# Patient Record
Sex: MALE | Race: WHITE | NOT HISPANIC OR LATINO | Employment: FULL TIME | URBAN - METROPOLITAN AREA
[De-identification: names, ages, dates, MRNs, and addresses within clinical notes are randomized per-mention and may not be internally consistent; named-entity substitution may affect disease eponyms.]

---

## 2017-03-21 ENCOUNTER — CONSULT (OUTPATIENT)
Dept: CARDIOLOGY | Facility: CLINIC | Age: 37
End: 2017-03-21

## 2017-03-21 VITALS
HEIGHT: 71 IN | WEIGHT: 183.8 LBS | BODY MASS INDEX: 25.73 KG/M2 | HEART RATE: 65 BPM | DIASTOLIC BLOOD PRESSURE: 78 MMHG | SYSTOLIC BLOOD PRESSURE: 128 MMHG

## 2017-03-21 DIAGNOSIS — I45.6 WOLFF-PARKINSON-WHITE SYNDROME: Primary | ICD-10-CM

## 2017-03-21 DIAGNOSIS — R07.89 OTHER CHEST PAIN: ICD-10-CM

## 2017-03-21 PROCEDURE — 93000 ELECTROCARDIOGRAM COMPLETE: CPT | Performed by: INTERNAL MEDICINE

## 2017-03-21 PROCEDURE — 99243 OFF/OP CNSLTJ NEW/EST LOW 30: CPT | Performed by: INTERNAL MEDICINE

## 2017-03-21 RX ORDER — FLUTICASONE PROPIONATE 50 MCG
2 SPRAY, SUSPENSION (ML) NASAL DAILY
COMMUNITY
End: 2023-02-01

## 2017-03-21 RX ORDER — METOPROLOL TARTRATE 50 MG/1
50 TABLET, FILM COATED ORAL 2 TIMES DAILY
COMMUNITY
End: 2017-03-30

## 2017-03-21 RX ORDER — FEXOFENADINE HCL 180 MG/1
180 TABLET ORAL DAILY
COMMUNITY

## 2017-03-21 NOTE — PROGRESS NOTES
"PCP: Dr. Ramya Brannon MD  REFERRING MD: Agustin Padgett MD  Prior Cardiologist:      Chief Complaint: WPW  Referred for consult regarding abnormal EKG WPW.  Onset: 3/17  Duration: Hours  Location: Chest  Quality: Intermittent  Frequency: Once  Severity: Moderate  Timing: Random  Symptoms Atypical chest pain with sob,vasovagal symptoms  Associated Factors/Procedures to treat: [facility] 3/13/17  Medication(s) used: Beta blocker started by Dr. Agustin Padgett     No problems updated.    Past Medical History   Diagnosis Date   • Arrhythmia    • Atrial fibrillation    • Chest pain      left sided - radiating down left arm    • Migraine headache    • Seasonal allergies      with allergy shots    • Priscilla-Parkinson-White syndrome         Past Surgical History   Procedure Laterality Date   • Tonsillectomy  1980          Family History: no early family history of CAD    Social History     Social History   • Marital status:      Spouse name: N/A   • Number of children: N/A   • Years of education: N/A     Social History Main Topics   • Smoking status: Never Smoker   • Smokeless tobacco: Never Used   • Alcohol use No   • Drug use: No   • Sexual activity: Defer     Other Topics Concern   • None     Social History Narrative          Current Outpatient Prescriptions:   •  fexofenadine (ALLEGRA) 180 MG tablet, Take 180 mg by mouth Daily., Disp: , Rfl:   •  fluticasone (FLONASE) 50 MCG/ACT nasal spray, 2 sprays into each nostril Daily., Disp: , Rfl:   •  metoprolol tartrate (LOPRESSOR) 50 MG tablet, Take 50 mg by mouth 2 (Two) Times a Day., Disp: , Rfl:    No Known Allergies     Review of Symptoms: Completely negative save the above pertinent findings    Physical Exam:    Visit Vitals   • /78 (BP Location: Left arm, Patient Position: Sitting)   • Pulse 65   • Ht 71\" (180.3 cm)   • Wt 183 lb 12.8 oz (83.4 kg)   • BMI 25.63 kg/m2        General: Appears well groomed, well developed, appearing stated age, in " no acute distress or discomfort  HEENT: Normocephalic, atraumatic, PERRLA, EOMI, sclera anicteric, oral mucosa moist, no erythema or exudates  Neck: Supple, non-tender, no JVD, no thyroid nodularity or thyromegaly, no bruits, pules normal  Lungs; CTA, no wheezing, equal air entry bilaterally, resonant to percussion  Cor: RRR, physiologic S1/S2, no rubs, gallops, murmurs thrills, rubs or clicks  Abd: Soft, non-tender, no organomegaly or bruits  Ext: Non-tender, no edema ,no cycanosis,  femoral/pedal pulses normal  Neuro: AAO X 3 CN II-XII grossly intact and equal bilaterally  Skin: pink, warm, dry  Musculoskeletal: Normal range of motion and no liborio abnormalities        ECG 12 Lead  Date/Time: 3/21/2017 2:40 PM  Performed by: ANTWON GUTIERREZ  Authorized by: ANTWON GUTIERREZ   Comparison: not compared with previous ECG   Rhythm: sinus rhythm  Other findings: delta wave             Diagnosis Plan   1. Priscilla-Parkinson-White syndrome  EPS +/- ablation   2. Other chest pain        Will Panchtio ROCA as a Scribe for Dr. Gutierrez    The patient's old records including ambulatory rhythm recordings (ECGs, Holter/event monitor) and ultrasound (ECHO) were reviewed and discussed.          Antwon HARRINGTON MD personally performed the services described as documented by the above named individual. I have made any necessary edits, and it is both accurate and complete.

## 2017-03-24 ENCOUNTER — PREP FOR SURGERY (OUTPATIENT)
Dept: CARDIOLOGY | Facility: CLINIC | Age: 37
End: 2017-03-24

## 2017-03-24 DIAGNOSIS — I45.6 WPW (WOLFF-PARKINSON-WHITE SYNDROME): Primary | ICD-10-CM

## 2017-03-24 RX ORDER — ACETAMINOPHEN 325 MG/1
650 TABLET ORAL EVERY 4 HOURS PRN
Status: CANCELLED | OUTPATIENT
Start: 2017-03-24

## 2017-03-24 RX ORDER — SODIUM CHLORIDE 0.9 % (FLUSH) 0.9 %
1-10 SYRINGE (ML) INJECTION AS NEEDED
Status: CANCELLED | OUTPATIENT
Start: 2017-03-24

## 2017-03-30 ENCOUNTER — APPOINTMENT (OUTPATIENT)
Dept: PREADMISSION TESTING | Facility: HOSPITAL | Age: 37
End: 2017-03-30

## 2017-03-30 DIAGNOSIS — I45.6 WPW (WOLFF-PARKINSON-WHITE SYNDROME): ICD-10-CM

## 2017-03-30 LAB
ANION GAP SERPL CALCULATED.3IONS-SCNC: 10 MMOL/L (ref 3–11)
BASOPHILS # BLD AUTO: 0.04 10*3/MM3 (ref 0–0.2)
BASOPHILS NFR BLD AUTO: 0.6 % (ref 0–1)
BUN BLD-MCNC: 13 MG/DL (ref 9–23)
BUN/CREAT SERPL: 10.8 (ref 7–25)
CALCIUM SPEC-SCNC: 9.7 MG/DL (ref 8.7–10.4)
CHLORIDE SERPL-SCNC: 102 MMOL/L (ref 99–109)
CO2 SERPL-SCNC: 29 MMOL/L (ref 20–31)
CREAT BLD-MCNC: 1.2 MG/DL (ref 0.6–1.3)
DEPRECATED RDW RBC AUTO: 38.7 FL (ref 37–54)
EOSINOPHIL # BLD AUTO: 0.22 10*3/MM3 (ref 0.1–0.3)
EOSINOPHIL NFR BLD AUTO: 3.3 % (ref 0–3)
ERYTHROCYTE [DISTWIDTH] IN BLOOD BY AUTOMATED COUNT: 12.7 % (ref 11.3–14.5)
GFR SERPL CREATININE-BSD FRML MDRD: 67 ML/MIN/1.73
GLUCOSE BLD-MCNC: 98 MG/DL (ref 70–100)
HCT VFR BLD AUTO: 42.8 % (ref 38.9–50.9)
HGB BLD-MCNC: 14.7 G/DL (ref 13.1–17.5)
IMM GRANULOCYTES # BLD: 0 10*3/MM3 (ref 0–0.03)
IMM GRANULOCYTES NFR BLD: 0 % (ref 0–0.6)
INR PPP: 0.99
LYMPHOCYTES # BLD AUTO: 2.96 10*3/MM3 (ref 0.6–4.8)
LYMPHOCYTES NFR BLD AUTO: 44.4 % (ref 24–44)
MCH RBC QN AUTO: 28.9 PG (ref 27–31)
MCHC RBC AUTO-ENTMCNC: 34.3 G/DL (ref 32–36)
MCV RBC AUTO: 84.1 FL (ref 80–99)
MONOCYTES # BLD AUTO: 0.51 10*3/MM3 (ref 0–1)
MONOCYTES NFR BLD AUTO: 7.7 % (ref 0–12)
NEUTROPHILS # BLD AUTO: 2.93 10*3/MM3 (ref 1.5–8.3)
NEUTROPHILS NFR BLD AUTO: 44 % (ref 41–71)
PLATELET # BLD AUTO: 193 10*3/MM3 (ref 150–450)
PMV BLD AUTO: 10.2 FL (ref 6–12)
POTASSIUM BLD-SCNC: 4 MMOL/L (ref 3.5–5.5)
PROTHROMBIN TIME: 10.8 SECONDS (ref 9.6–11.5)
RBC # BLD AUTO: 5.09 10*6/MM3 (ref 4.2–5.76)
SODIUM BLD-SCNC: 141 MMOL/L (ref 132–146)
WBC NRBC COR # BLD: 6.66 10*3/MM3 (ref 3.5–10.8)

## 2017-03-30 PROCEDURE — 85610 PROTHROMBIN TIME: CPT | Performed by: PHYSICIAN ASSISTANT

## 2017-03-30 PROCEDURE — 80048 BASIC METABOLIC PNL TOTAL CA: CPT | Performed by: PHYSICIAN ASSISTANT

## 2017-03-30 PROCEDURE — 85025 COMPLETE CBC W/AUTO DIFF WBC: CPT | Performed by: PHYSICIAN ASSISTANT

## 2017-03-30 PROCEDURE — 36415 COLL VENOUS BLD VENIPUNCTURE: CPT

## 2017-03-30 RX ORDER — METOPROLOL TARTRATE 50 MG/1
50 TABLET, FILM COATED ORAL 2 TIMES DAILY
COMMUNITY
Start: 2017-03-13 | End: 2017-03-30

## 2017-03-31 ENCOUNTER — HOSPITAL ENCOUNTER (OUTPATIENT)
Facility: HOSPITAL | Age: 37
Discharge: HOME | End: 2017-03-31
Attending: INTERNAL MEDICINE | Admitting: INTERNAL MEDICINE

## 2017-03-31 VITALS
SYSTOLIC BLOOD PRESSURE: 125 MMHG | HEIGHT: 72 IN | HEART RATE: 98 BPM | RESPIRATION RATE: 14 BRPM | DIASTOLIC BLOOD PRESSURE: 85 MMHG | OXYGEN SATURATION: 92 % | WEIGHT: 179.9 LBS | BODY MASS INDEX: 24.37 KG/M2 | TEMPERATURE: 99.2 F

## 2017-03-31 DIAGNOSIS — I45.6 WOLFF-PARKINSON-WHITE SYNDROME: ICD-10-CM

## 2017-03-31 PROCEDURE — 93623 PRGRMD STIMJ&PACG IV RX NFS: CPT | Performed by: INTERNAL MEDICINE

## 2017-03-31 PROCEDURE — C1732 CATH, EP, DIAG/ABL, 3D/VECT: HCPCS | Performed by: INTERNAL MEDICINE

## 2017-03-31 PROCEDURE — C1730 CATH, EP, 19 OR FEW ELECT: HCPCS | Performed by: INTERNAL MEDICINE

## 2017-03-31 PROCEDURE — 25010000002 FENTANYL CITRATE (PF) 100 MCG/2ML SOLUTION: Performed by: INTERNAL MEDICINE

## 2017-03-31 PROCEDURE — 93621 COMP EP EVL L PAC&REC C SINS: CPT | Performed by: INTERNAL MEDICINE

## 2017-03-31 PROCEDURE — C1894 INTRO/SHEATH, NON-LASER: HCPCS | Performed by: INTERNAL MEDICINE

## 2017-03-31 PROCEDURE — 25010000002 ONDANSETRON PER 1 MG: Performed by: INTERNAL MEDICINE

## 2017-03-31 PROCEDURE — 93620 COMP EP EVL R AT VEN PAC&REC: CPT | Performed by: INTERNAL MEDICINE

## 2017-03-31 PROCEDURE — 25010000002 HEPARIN (PORCINE) PER 1000 UNITS: Performed by: INTERNAL MEDICINE

## 2017-03-31 PROCEDURE — 25010000002 MIDAZOLAM PER 1 MG: Performed by: INTERNAL MEDICINE

## 2017-03-31 PROCEDURE — 93613 INTRACARDIAC EPHYS 3D MAPG: CPT | Performed by: INTERNAL MEDICINE

## 2017-03-31 RX ORDER — LIDOCAINE HYDROCHLORIDE 10 MG/ML
INJECTION, SOLUTION INFILTRATION; PERINEURAL AS NEEDED
Status: DISCONTINUED | OUTPATIENT
Start: 2017-03-31 | End: 2017-03-31 | Stop reason: HOSPADM

## 2017-03-31 RX ORDER — ONDANSETRON 2 MG/ML
INJECTION INTRAMUSCULAR; INTRAVENOUS AS NEEDED
Status: DISCONTINUED | OUTPATIENT
Start: 2017-03-31 | End: 2017-03-31 | Stop reason: HOSPADM

## 2017-03-31 RX ORDER — FENTANYL CITRATE 50 UG/ML
INJECTION, SOLUTION INTRAMUSCULAR; INTRAVENOUS AS NEEDED
Status: DISCONTINUED | OUTPATIENT
Start: 2017-03-31 | End: 2017-03-31 | Stop reason: HOSPADM

## 2017-03-31 RX ORDER — MIDAZOLAM HYDROCHLORIDE 1 MG/ML
INJECTION INTRAMUSCULAR; INTRAVENOUS AS NEEDED
Status: DISCONTINUED | OUTPATIENT
Start: 2017-03-31 | End: 2017-03-31 | Stop reason: HOSPADM

## 2017-03-31 RX ORDER — FLECAINIDE ACETATE 50 MG/1
50 TABLET ORAL 2 TIMES DAILY
Qty: 60 TABLET | Refills: 11 | Status: SHIPPED | OUTPATIENT
Start: 2017-03-31 | End: 2017-05-23 | Stop reason: SDUPTHER

## 2017-03-31 RX ORDER — ONDANSETRON 2 MG/ML
4 INJECTION INTRAMUSCULAR; INTRAVENOUS EVERY 6 HOURS PRN
Status: DISCONTINUED | OUTPATIENT
Start: 2017-03-31 | End: 2017-03-31 | Stop reason: HOSPADM

## 2017-03-31 RX ORDER — SODIUM CHLORIDE 9 MG/ML
INJECTION, SOLUTION INTRAVENOUS CONTINUOUS PRN
Status: DISCONTINUED | OUTPATIENT
Start: 2017-03-31 | End: 2017-03-31 | Stop reason: HOSPADM

## 2017-03-31 RX ORDER — SODIUM CHLORIDE 0.9 % (FLUSH) 0.9 %
1-10 SYRINGE (ML) INJECTION AS NEEDED
Status: DISCONTINUED | OUTPATIENT
Start: 2017-03-31 | End: 2017-03-31 | Stop reason: HOSPADM

## 2017-03-31 RX ORDER — ACETAMINOPHEN 325 MG/1
650 TABLET ORAL EVERY 4 HOURS PRN
Status: DISCONTINUED | OUTPATIENT
Start: 2017-03-31 | End: 2017-03-31 | Stop reason: HOSPADM

## 2017-03-31 RX ORDER — HYDROCODONE BITARTRATE AND ACETAMINOPHEN 10; 325 MG/1; MG/1
1 TABLET ORAL EVERY 4 HOURS PRN
Status: DISCONTINUED | OUTPATIENT
Start: 2017-03-31 | End: 2017-03-31 | Stop reason: HOSPADM

## 2017-04-03 ENCOUNTER — HOSPITAL ENCOUNTER (OUTPATIENT)
Dept: OTHER | Age: 37
Discharge: HOME | End: 2017-04-03
Attending: INTERNAL MEDICINE | Admitting: INTERNAL MEDICINE

## 2017-05-23 ENCOUNTER — OFFICE VISIT (OUTPATIENT)
Dept: CARDIOLOGY | Facility: CLINIC | Age: 37
End: 2017-05-23

## 2017-05-23 VITALS
BODY MASS INDEX: 25.63 KG/M2 | WEIGHT: 189.2 LBS | HEART RATE: 91 BPM | HEIGHT: 72 IN | SYSTOLIC BLOOD PRESSURE: 110 MMHG | DIASTOLIC BLOOD PRESSURE: 78 MMHG

## 2017-05-23 DIAGNOSIS — I45.6 WOLFF-PARKINSON-WHITE SYNDROME: Primary | ICD-10-CM

## 2017-05-23 PROCEDURE — 99212 OFFICE O/P EST SF 10 MIN: CPT | Performed by: PHYSICIAN ASSISTANT

## 2017-05-23 PROCEDURE — 93000 ELECTROCARDIOGRAM COMPLETE: CPT | Performed by: PHYSICIAN ASSISTANT

## 2017-05-23 RX ORDER — FLECAINIDE ACETATE 50 MG/1
50 TABLET ORAL 2 TIMES DAILY
Qty: 60 TABLET | Refills: 11 | Status: SHIPPED | OUTPATIENT
Start: 2017-05-23 | End: 2018-01-10 | Stop reason: SDUPTHER

## 2017-05-30 ENCOUNTER — OFFICE VISIT (OUTPATIENT)
Dept: INTERNAL MEDICINE | Facility: CLINIC | Age: 37
End: 2017-05-30

## 2017-05-30 VITALS
HEIGHT: 72 IN | HEART RATE: 78 BPM | TEMPERATURE: 98.2 F | SYSTOLIC BLOOD PRESSURE: 102 MMHG | WEIGHT: 189 LBS | DIASTOLIC BLOOD PRESSURE: 60 MMHG | OXYGEN SATURATION: 99 % | BODY MASS INDEX: 25.6 KG/M2

## 2017-05-30 DIAGNOSIS — H69.81 EUSTACHIAN TUBE DYSFUNCTION, RIGHT: ICD-10-CM

## 2017-05-30 DIAGNOSIS — H65.91 OME (OTITIS MEDIA WITH EFFUSION), RIGHT: Primary | ICD-10-CM

## 2017-05-30 PROCEDURE — 96372 THER/PROPH/DIAG INJ SC/IM: CPT | Performed by: PHYSICIAN ASSISTANT

## 2017-05-30 PROCEDURE — 99213 OFFICE O/P EST LOW 20 MIN: CPT | Performed by: PHYSICIAN ASSISTANT

## 2017-05-30 RX ORDER — METHYLPREDNISOLONE ACETATE 80 MG/ML
80 INJECTION, SUSPENSION INTRA-ARTICULAR; INTRALESIONAL; INTRAMUSCULAR; SOFT TISSUE ONCE
Status: COMPLETED | OUTPATIENT
Start: 2017-05-30 | End: 2017-05-30

## 2017-05-30 RX ORDER — AMOXICILLIN AND CLAVULANATE POTASSIUM 875; 125 MG/1; MG/1
1 TABLET, FILM COATED ORAL 2 TIMES DAILY
Qty: 14 TABLET | Refills: 0 | Status: SHIPPED | OUTPATIENT
Start: 2017-05-30 | End: 2017-06-06

## 2017-05-30 RX ADMIN — METHYLPREDNISOLONE ACETATE 80 MG: 80 INJECTION, SUSPENSION INTRA-ARTICULAR; INTRALESIONAL; INTRAMUSCULAR; SOFT TISSUE at 11:54

## 2018-01-10 ENCOUNTER — OFFICE VISIT (OUTPATIENT)
Dept: CARDIOLOGY | Facility: CLINIC | Age: 38
End: 2018-01-10

## 2018-01-10 VITALS
WEIGHT: 196 LBS | BODY MASS INDEX: 27.44 KG/M2 | SYSTOLIC BLOOD PRESSURE: 126 MMHG | DIASTOLIC BLOOD PRESSURE: 78 MMHG | HEART RATE: 80 BPM | HEIGHT: 71 IN

## 2018-01-10 DIAGNOSIS — I45.6 WOLFF-PARKINSON-WHITE SYNDROME: Primary | ICD-10-CM

## 2018-01-10 DIAGNOSIS — E78.2 MIXED HYPERLIPIDEMIA: ICD-10-CM

## 2018-01-10 PROCEDURE — 93000 ELECTROCARDIOGRAM COMPLETE: CPT | Performed by: INTERNAL MEDICINE

## 2018-01-10 PROCEDURE — 99213 OFFICE O/P EST LOW 20 MIN: CPT | Performed by: INTERNAL MEDICINE

## 2018-01-10 RX ORDER — FLECAINIDE ACETATE 50 MG/1
50 TABLET ORAL EVERY 12 HOURS SCHEDULED
Qty: 180 TABLET | Refills: 1 | Status: SHIPPED | OUTPATIENT
Start: 2018-01-10 | End: 2018-06-19 | Stop reason: SDUPTHER

## 2018-01-10 RX ORDER — ACETAMINOPHEN 325 MG/1
650 TABLET ORAL AS NEEDED
COMMUNITY

## 2018-01-10 NOTE — PROGRESS NOTES
"[facility] at [facility]  Office visit  Rj Rincon  1980  [phone]  [phone]  VISIT DATE:  01/10/2018    PCP: Ramya Brannon MD  [address] [place] [id number]    CC:  No chief complaint on file.      PROBLEM LIST:  A. 5/9/2012 12 lead EKG revealing Priscilla Parkinson-White Pattern at 78 beats per minute  B. No previous cardiac evaluation   C. EPS 3/31/17 with mid-septal pathway antegrade block point 240 ms with no inducible arrhythmias, and sensitive to catheter pressure, no ablation attempted  D. Flecainide initiated 3/31/17-    ASSESSMENT:   Diagnosis Plan   1. Priscilla-Parkinson-White syndrome         PLAN:  Continue flecainide 50 mg by mouth twice a day  repeat fasting lipid panel prior follow-up.    Subjective  Stable functional capacity.  Blood pressures running less than 130/80 mmHg.  Denies sustained palpitations, presyncope, syncope or chest pain.  Tolerating medications without side effects.  Reviewed 12-lead EKG which reveals stable preexcitation.  Also reviewed previous lipid panel.  He does have an extensive family history of coronary artery disease to include his mother.  Surgical revascularization in her 60s who is a nonsmoker.    PHYSICAL EXAMINATION:  Vitals:    01/10/18 1510   BP: 126/78   BP Location: Right arm   Patient Position: Sitting   Pulse: 80   Weight: 88.9 kg (196 lb)   Height: 180.3 cm (71\")     General Appearance:    Alert, cooperative, no distress, appears stated age   Head:    Normocephalic, without obvious abnormality, atraumatic   Eyes:    conjunctiva/corneas clear   Nose:   Nares normal, septum midline, mucosa normal, no drainage   Throat:   Lips, teeth and gums normal   Neck:   Supple, symmetrical, trachea midline, no carotid    bruit or JVD   Lungs:     Clear to auscultation bilaterally, respirations unlabored   Chest Wall:    No tenderness or deformity    Heart:    Regular rate and rhythm, S1 and S2 normal, no murmur, rub   or gallop, " normal carotid impulse bilaterally without bruit.   Abdomen:     Soft, non-tender   Extremities:   Extremities normal, atraumatic, no cyanosis or edema   Pulses:   2+ and symmetric all extremities   Skin:   Skin color, texture, turgor normal, no rashes or lesions       Diagnostic Data:    ECG 12 Lead  Date/Time: 1/10/2018 3:24 PM  Performed by: BEBETO RIVAS III  Authorized by: BEBETO RIVAS III   Rhythm: sinus rhythm  Other findings comments: Priscilla-Parkinson-White pattern  Clinical impression: abnormal ECG          Lab Results   Component Value Date    CHLPL 189 03/11/2015    TRIG 99 03/11/2015    HDL 45 03/11/2015    LDLDIRECT 140 (H) 03/11/2015     Lab Results   Component Value Date    GLUCOSE 98 03/30/2017    BUN 13 03/30/2017    CREATININE 1.20 03/30/2017     03/30/2017    K 4.0 03/30/2017     03/30/2017    CO2 29.0 03/30/2017     No results found for: HGBA1C  Lab Results   Component Value Date    WBC 6.66 03/30/2017    HGB 14.7 03/30/2017    HCT 42.8 03/30/2017     03/30/2017       Allergies  No Known Allergies    Current Medications    Current Outpatient Prescriptions:   •  acetaminophen (TYLENOL) 325 MG tablet, Take 650 mg by mouth As Needed for Mild Pain ., Disp: , Rfl:   •  Ascorbic Acid (VITAMIN C PO), Take  by mouth Daily., Disp: , Rfl:   •  fexofenadine (ALLEGRA) 180 MG tablet, Take 180 mg by mouth As Needed., Disp: , Rfl:   •  flecainide (TAMBOCOR) 50 MG tablet, Take 1 tablet by mouth 2 (Two) Times a Day., Disp: 60 tablet, Rfl: 11  •  fluticasone (FLONASE) 50 MCG/ACT nasal spray, 2 sprays into each nostril Daily., Disp: , Rfl:   •  ibuprofen (ADVIL,MOTRIN) 100 MG/5ML suspension, Take  by mouth As Needed for Mild Pain ., Disp: , Rfl:   •  Multiple Vitamin (MULTI VITAMIN PO), Take  by mouth Daily., Disp: , Rfl:           ROS  Review of Systems   Cardiovascular: Negative for chest pain, dyspnea on exertion, irregular heartbeat and palpitations.   Respiratory: Negative for shortness of  breath and wheezing.        SOCIAL HX  Social History     Social History   • Marital status:      Spouse name: N/A   • Number of children: N/A   • Years of education: N/A     Occupational History   • Not on file.     Social History Main Topics   • Smoking status: Never Smoker   • Smokeless tobacco: Never Used   • Alcohol use No   • Drug use: No   • Sexual activity: Defer     Other Topics Concern   • Not on file     Social History Narrative       FAMILY HX  Family History   Problem Relation Age of Onset   • Other Mother 69     CABG   • Other Father      CABG in his 60's             Zurdo Allen III, MD, FACC

## 2018-06-19 RX ORDER — FLECAINIDE ACETATE 50 MG/1
50 TABLET ORAL EVERY 12 HOURS SCHEDULED
Qty: 180 TABLET | Refills: 1 | Status: SHIPPED | OUTPATIENT
Start: 2018-06-19 | End: 2019-04-17

## 2018-09-22 ENCOUNTER — HOSPITAL ENCOUNTER (OUTPATIENT)
Facility: HOSPITAL | Age: 38
Discharge: HOME | End: 2018-09-22
Payer: COMMERCIAL

## 2018-09-22 LAB
CHOLESTEROL, TOTAL: 231 MG/DL (ref 0–200)
HDLC SERPL-MCNC: 43 MG/DL (ref 40–60)
LDL CHOLESTEROL CALCULATED: 161 MG/DL
TRIGL SERPL-MCNC: 134 MG/DL (ref 0–249)
VLDLC SERPL CALC-MCNC: 27 MG/DL

## 2018-09-22 PROCEDURE — 36415 COLL VENOUS BLD VENIPUNCTURE: CPT

## 2018-09-22 PROCEDURE — 80061 LIPID PANEL: CPT

## 2018-09-24 DIAGNOSIS — E78.2 MIXED HYPERLIPIDEMIA: ICD-10-CM

## 2018-09-27 ENCOUNTER — OFFICE VISIT (OUTPATIENT)
Dept: CARDIOLOGY | Facility: CLINIC | Age: 38
End: 2018-09-27

## 2018-09-27 ENCOUNTER — HOSPITAL ENCOUNTER (OUTPATIENT)
Facility: HOSPITAL | Age: 38
Discharge: HOME | End: 2018-09-27
Payer: COMMERCIAL

## 2018-09-27 VITALS
DIASTOLIC BLOOD PRESSURE: 82 MMHG | HEIGHT: 71 IN | HEART RATE: 69 BPM | SYSTOLIC BLOOD PRESSURE: 140 MMHG | BODY MASS INDEX: 27.44 KG/M2 | WEIGHT: 196 LBS

## 2018-09-27 DIAGNOSIS — E78.2 MIXED HYPERLIPIDEMIA: ICD-10-CM

## 2018-09-27 DIAGNOSIS — I45.6 WOLFF-PARKINSON-WHITE SYNDROME: Primary | ICD-10-CM

## 2018-09-27 PROCEDURE — 93005 ELECTROCARDIOGRAM TRACING: CPT

## 2018-09-27 PROCEDURE — 99213 OFFICE O/P EST LOW 20 MIN: CPT | Performed by: INTERNAL MEDICINE

## 2018-09-27 NOTE — PROGRESS NOTES
"[facility] at [facility]  Office visit  Rj Rincon  1980  [phone]  [phone]  VISIT DATE:  01/10/2018    PCP: Ramya Brannon MD  [facility] [address] [place] [id number]    CC:  Chief Complaint   Patient presents with   • Priscilla-Parkinson-White Syndrome       PROBLEM LIST:  A. 5/9/2012 12 lead EKG revealing Priscilla Parkinson-White Pattern at 78 beats per minute  B. No previous cardiac evaluation   C. EPS 3/31/17 with mid-septal pathway antegrade block point 240 ms with no inducible arrhythmias, and sensitive to catheter pressure, no ablation attempted  D. Flecainide initiated 3/31/17-    ASSESSMENT:   Diagnosis Plan   1. Priscilla-Parkinson-White syndrome     2. Mixed hyperlipidemia  CT Cardiac Calcium Score Without Dye       PLAN:  Continue flecainide 50 mg by mouth twice a day  Repeat fasting lipid panel prior follow-up. Continue dietary modifications and regular exercise, we'll arrange for coronary artery calcium score to help guide initiation of pharmacologic therapy    Subjective  Stable functional capacity.  Blood pressures running less than 130/80 mmHg.  Denies sustained palpitations, presyncope, syncope or chest pain.  Tolerating medications without side effects.  Reviewed 12-lead EKG which reveals stable preexcitation.  Also reviewed previous lipid panel which revealed worsening in his LDL level.  He does have an extensive family history of coronary artery disease to include his mother.  Surgical revascularization in her 60s who is a nonsmoker. We discussed initiation of pharmacologic therapy with statin versus continue dietary modification with repeat fasting lipid panel in 6 months.    PHYSICAL EXAMINATION:  Vitals:    09/27/18 1513   BP: 140/82   BP Location: Left arm   Patient Position: Sitting   Pulse: 69   Weight: 88.9 kg (196 lb)   Height: 180.3 cm (71\")     General Appearance:    Alert, cooperative, no distress, appears stated age   Head:    Normocephalic, without obvious " abnormality, atraumatic   Eyes:    conjunctiva/corneas clear   Nose:   Nares normal, septum midline, mucosa normal, no drainage   Throat:   Lips, teeth and gums normal   Neck:   Supple, symmetrical, trachea midline, no carotid    bruit or JVD   Lungs:     Clear to auscultation bilaterally, respirations unlabored   Chest Wall:    No tenderness or deformity    Heart:    Regular rate and rhythm, S1 and S2 normal, no murmur, rub   or gallop, normal carotid impulse bilaterally without bruit.   Abdomen:     Soft, non-tender   Extremities:   Extremities normal, atraumatic, no cyanosis or edema   Pulses:   2+ and symmetric all extremities   Skin:   Skin color, texture, turgor normal, no rashes or lesions       Diagnostic Data:    ECG 12 Lead  Date/Time: 9/27/2018 3:21 PM  Performed by: BEBETO RIVAS III  Authorized by: BEBETO RIVAS III   Comparison: compared with previous ECG   Similar to previous ECG  Rhythm: sinus rhythm  Other findings: delta wave  Other findings comments: WPW pattern  Clinical impression: abnormal ECG          Lab Results   Component Value Date    CHLPL 189 03/11/2015    TRIG 99 03/11/2015    HDL 45 03/11/2015     Lab Results   Component Value Date    GLUCOSE 98 03/30/2017    BUN 13 03/30/2017    CREATININE 1.20 03/30/2017     03/30/2017    K 4.0 03/30/2017     03/30/2017    CO2 29.0 03/30/2017     No results found for: HGBA1C  Lab Results   Component Value Date    WBC 6.66 03/30/2017    HGB 14.7 03/30/2017    HCT 42.8 03/30/2017     03/30/2017       Allergies  No Known Allergies    Current Medications    Current Outpatient Prescriptions:   •  acetaminophen (TYLENOL) 325 MG tablet, Take 650 mg by mouth As Needed for Mild Pain ., Disp: , Rfl:   •  Ascorbic Acid (VITAMIN C PO), Take  by mouth Daily., Disp: , Rfl:   •  fexofenadine (ALLEGRA) 180 MG tablet, Take 180 mg by mouth As Needed., Disp: , Rfl:   •  flecainide (TAMBOCOR) 50 MG tablet, Take 1 tablet by mouth Every 12 (Twelve)  Hours., Disp: 180 tablet, Rfl: 1  •  fluticasone (FLONASE) 50 MCG/ACT nasal spray, 2 sprays into each nostril Daily., Disp: , Rfl:   •  ibuprofen (ADVIL,MOTRIN) 100 MG/5ML suspension, Take  by mouth As Needed for Mild Pain ., Disp: , Rfl:   •  Multiple Vitamin (MULTI VITAMIN PO), Take  by mouth Daily., Disp: , Rfl:           ROS  Review of Systems   Cardiovascular: Negative for chest pain, dyspnea on exertion, irregular heartbeat and palpitations.   Respiratory: Negative for shortness of breath and wheezing.        SOCIAL HX  Social History     Social History   • Marital status:      Spouse name: N/A   • Number of children: N/A   • Years of education: N/A     Occupational History   • Not on file.     Social History Main Topics   • Smoking status: Never Smoker   • Smokeless tobacco: Never Used   • Alcohol use No   • Drug use: No   • Sexual activity: Defer     Other Topics Concern   • Not on file     Social History Narrative   • No narrative on file       FAMILY HX  Family History   Problem Relation Age of Onset   • Other Mother 69        CABG   • Other Father         CABG in his 60's             Zurdo Allen III, MD, FACC

## 2018-10-13 ENCOUNTER — HOSPITAL ENCOUNTER (OUTPATIENT)
Dept: CT IMAGING | Facility: HOSPITAL | Age: 38
Discharge: HOME | End: 2018-10-13
Attending: INTERNAL MEDICINE | Admitting: INTERNAL MEDICINE

## 2018-10-13 DIAGNOSIS — E78.2 MIXED HYPERLIPIDEMIA: ICD-10-CM

## 2018-10-13 PROCEDURE — 75571 CT HRT W/O DYE W/CA TEST: CPT

## 2018-10-15 ENCOUNTER — TELEPHONE (OUTPATIENT)
Dept: CARDIOLOGY | Facility: CLINIC | Age: 38
End: 2018-10-15

## 2018-10-15 NOTE — TELEPHONE ENCOUNTER
----- Message from Zurdo Allen III, MD sent at 10/15/2018  9:52 AM EDT -----  Reassuring calcium score.

## 2019-04-10 ENCOUNTER — HOSPITAL ENCOUNTER (OUTPATIENT)
Facility: HOSPITAL | Age: 39
Discharge: HOME | End: 2019-04-10
Payer: COMMERCIAL

## 2019-04-10 LAB
CHOLESTEROL, TOTAL: 191 MG/DL (ref 0–200)
HDLC SERPL-MCNC: 37 MG/DL (ref 40–60)
LDL CHOLESTEROL CALCULATED: 131 MG/DL
TRIGL SERPL-MCNC: 116 MG/DL (ref 0–249)
VLDLC SERPL CALC-MCNC: 23 MG/DL

## 2019-04-10 PROCEDURE — 80061 LIPID PANEL: CPT

## 2019-04-10 PROCEDURE — 36415 COLL VENOUS BLD VENIPUNCTURE: CPT

## 2019-04-11 ENCOUNTER — OFFICE VISIT (OUTPATIENT)
Dept: CARDIOLOGY | Facility: CLINIC | Age: 39
End: 2019-04-11

## 2019-04-11 ENCOUNTER — HOSPITAL ENCOUNTER (OUTPATIENT)
Facility: HOSPITAL | Age: 39
Discharge: HOME | End: 2019-04-11
Payer: COMMERCIAL

## 2019-04-11 VITALS
SYSTOLIC BLOOD PRESSURE: 132 MMHG | HEIGHT: 71 IN | OXYGEN SATURATION: 98 % | HEART RATE: 95 BPM | BODY MASS INDEX: 27.19 KG/M2 | WEIGHT: 194.2 LBS | DIASTOLIC BLOOD PRESSURE: 80 MMHG

## 2019-04-11 DIAGNOSIS — E78.2 MIXED HYPERLIPIDEMIA: ICD-10-CM

## 2019-04-11 DIAGNOSIS — I45.6 WOLFF-PARKINSON-WHITE SYNDROME: Primary | ICD-10-CM

## 2019-04-11 PROCEDURE — 93000 ELECTROCARDIOGRAM COMPLETE: CPT | Performed by: INTERNAL MEDICINE

## 2019-04-11 PROCEDURE — 93005 ELECTROCARDIOGRAM TRACING: CPT

## 2019-04-11 PROCEDURE — 99213 OFFICE O/P EST LOW 20 MIN: CPT | Performed by: INTERNAL MEDICINE

## 2019-04-11 RX ORDER — IBUPROFEN 200 MG
200 TABLET ORAL EVERY 6 HOURS PRN
COMMUNITY

## 2019-04-11 NOTE — PROGRESS NOTES
"[facility] at [facility]  Office visit  Rj Rincon  1980  [phone]  [phone]  VISIT DATE:  01/10/2018    PCP: Ramya Brannon  [address] [place] [id number]    CC:  Chief Complaint   Patient presents with   • Priscilla-Parkinson-White Syndrome       PROBLEM LIST:  A. 5/9/2012 12 lead EKG revealing Priscilla Parkinson-White Pattern at 78 beats per minute  B. No previous cardiac evaluation   C. EPS 3/31/17 with mid-septal pathway antegrade block point 240 ms with no inducible arrhythmias, and sensitive to catheter pressure, no ablation attempted  D. Flecainide initiated 3/31/17-    ASSESSMENT:   Diagnosis Plan   1. Priscilla-Parkinson-White syndrome     2. Mixed hyperlipidemia         PLAN:  WPW: Continue flecainide 50 mg by mouth twice a day.  Symptoms currently well controlled.  Hyperlipidemia: Coronary calcium score is 0. Continue dietary modifications and regular exercise.  Repeat fasting lipid panel in 6 months.    Subjective  Stable functional capacity.  Blood pressures running less than 130/80 mmHg.  Had a brief flare of intermittent palpitations in December when he was taking decongestants and prednisone for allergies.  Describes really brief fluttering sensations which last less than 2-3 seconds with no associated symptoms.  Now completely asymptomatic..  Tolerating medications without side effects.  Reviewed 12-lead EKG which reveals stable preexcitation.  Coronary calcium score of 0.  He does have an extensive family history of coronary artery disease to include his mother.  Surgical revascularization in her 60s who is a nonsmoker.     PHYSICAL EXAMINATION:  Vitals:    04/11/19 1449   BP: 132/80   BP Location: Left arm   Patient Position: Sitting   Pulse: 95   SpO2: 98%   Weight: 88.1 kg (194 lb 3.2 oz)   Height: 180.3 cm (71\")     General Appearance:    Alert, cooperative, no distress, appears stated age   Head:    Normocephalic, without obvious abnormality, atraumatic "   Eyes:    conjunctiva/corneas clear   Nose:   Nares normal, septum midline, mucosa normal, no drainage   Throat:   Lips, teeth and gums normal   Neck:   Supple, symmetrical, trachea midline, no carotid    bruit or JVD   Lungs:     Clear to auscultation bilaterally, respirations unlabored   Chest Wall:    No tenderness or deformity    Heart:    Regular rate and rhythm, S1 and S2 normal, no murmur, rub   or gallop, normal carotid impulse bilaterally without bruit.   Abdomen:     Soft, non-tender   Extremities:   Extremities normal, atraumatic, no cyanosis or edema   Pulses:   2+ and symmetric all extremities   Skin:   Skin color, texture, turgor normal, no rashes or lesions       Diagnostic Data:    ECG 12 Lead  Date/Time: 4/11/2019 3:16 PM  Performed by: Zurdo Allen III, MD  Authorized by: Zurdo Allen III, MD   Comparison: compared with previous ECG   Similar to previous ECG  Rhythm: sinus rhythm  Other findings comments: WPW pattern    Clinical impression: abnormal EKG          Lab Results   Component Value Date    CHLPL 189 03/11/2015    TRIG 99 03/11/2015    HDL 45 03/11/2015     Lab Results   Component Value Date    GLUCOSE 98 03/30/2017    BUN 13 03/30/2017    CREATININE 1.20 03/30/2017     03/30/2017    K 4.0 03/30/2017     03/30/2017    CO2 29.0 03/30/2017     No results found for: HGBA1C  Lab Results   Component Value Date    WBC 6.66 03/30/2017    HGB 14.7 03/30/2017    HCT 42.8 03/30/2017     03/30/2017       Allergies  No Known Allergies    Current Medications    Current Outpatient Medications:   •  acetaminophen (TYLENOL) 325 MG tablet, Take 650 mg by mouth As Needed for Mild Pain ., Disp: , Rfl:   •  Ascorbic Acid (VITAMIN C PO), Take 1 tablet by mouth Daily., Disp: , Rfl:   •  fexofenadine (ALLEGRA) 180 MG tablet, Take 180 mg by mouth Daily., Disp: , Rfl:   •  flecainide (TAMBOCOR) 50 MG tablet, Take 1 tablet by mouth Every 12 (Twelve) Hours., Disp: 180 tablet, Rfl: 1  •   fluticasone (FLONASE) 50 MCG/ACT nasal spray, 2 sprays into each nostril Daily., Disp: , Rfl:   •  ibuprofen (ADVIL) 200 MG tablet, Take 200 mg by mouth Every 6 (Six) Hours As Needed for Mild Pain ., Disp: , Rfl:   •  Multiple Vitamin (MULTI VITAMIN PO), Take 1 tablet by mouth Daily., Disp: , Rfl:           ROS  Review of Systems   Cardiovascular: Negative for chest pain, dyspnea on exertion, irregular heartbeat and palpitations.   Respiratory: Negative for shortness of breath and wheezing.        SOCIAL HX  Social History     Socioeconomic History   • Marital status:      Spouse name: Not on file   • Number of children: Not on file   • Years of education: Not on file   • Highest education level: Not on file   Tobacco Use   • Smoking status: Never Smoker   • Smokeless tobacco: Never Used   Substance and Sexual Activity   • Alcohol use: No   • Drug use: No   • Sexual activity: Defer       FAMILY HX  Family History   Problem Relation Age of Onset   • Other Mother 69        CABG   • Other Father         CABG in his 60's             Zurdo Allen III, MD, FACC

## 2019-04-17 ENCOUNTER — TELEPHONE (OUTPATIENT)
Dept: CARDIOLOGY | Facility: CLINIC | Age: 39
End: 2019-04-17

## 2019-04-17 DIAGNOSIS — I45.6 WOLFF-PARKINSON-WHITE SYNDROME: Primary | ICD-10-CM

## 2019-04-17 NOTE — TELEPHONE ENCOUNTER
Patient notified of Dr. Allen recommending that he stop his Flecainide and a follow up 2-week monitor after he has been off his Flecainide for at least a week. Aslo, needs a f/u with  after his monitor has been completed. He verbalized understanding. Stated would call back after he was off his Flecainide for at least one week. Told him our holter monitor staff would contact him to set it up and  would call him to set up f/u with Tracy.

## 2019-04-17 NOTE — TELEPHONE ENCOUNTER
----- Message from Zurdo Allen III, MD sent at 4/17/2019  9:36 AM EDT -----  Let him know that I have discussed his case with a heart rhythm expert and we are recommending that he stop his flecainide and have a follow-up 2-week monitor after he has been off his flecainide for at least a week.  Would arrange follow-up with Dr. Molina after his monitor has been completed.

## 2021-03-30 ENCOUNTER — HOSPITAL ENCOUNTER (OUTPATIENT)
Facility: HOSPITAL | Age: 41
Discharge: HOME | End: 2021-03-30
Payer: COMMERCIAL

## 2021-03-30 PROCEDURE — 99001 SPECIMEN HANDLING PT-LAB: CPT

## 2021-04-15 ENCOUNTER — HOSPITAL ENCOUNTER (OUTPATIENT)
Facility: HOSPITAL | Age: 41
Discharge: HOME | End: 2021-04-15
Payer: COMMERCIAL

## 2021-04-15 PROCEDURE — 99001 SPECIMEN HANDLING PT-LAB: CPT

## 2022-02-11 ENCOUNTER — TELEPHONE (OUTPATIENT)
Dept: URGENT CARE | Facility: CLINIC | Age: 42
End: 2022-02-11

## 2022-02-11 DIAGNOSIS — J01.90 ACUTE SINUSITIS, RECURRENCE NOT SPECIFIED, UNSPECIFIED LOCATION: Primary | ICD-10-CM

## 2022-02-11 RX ORDER — AZITHROMYCIN 250 MG/1
TABLET, FILM COATED ORAL
Qty: 6 TABLET | Refills: 0 | Status: SHIPPED | OUTPATIENT
Start: 2022-02-11 | End: 2022-04-19

## 2022-02-11 RX ORDER — METHYLPREDNISOLONE 4 MG/1
TABLET ORAL
Qty: 1 EACH | Refills: 0 | Status: SHIPPED | OUTPATIENT
Start: 2022-02-11 | End: 2022-04-19

## 2022-04-19 ENCOUNTER — OFFICE VISIT (OUTPATIENT)
Dept: CARDIOLOGY | Facility: CLINIC | Age: 42
End: 2022-04-19

## 2022-04-19 ENCOUNTER — HOSPITAL ENCOUNTER (OUTPATIENT)
Facility: HOSPITAL | Age: 42
Discharge: HOME | End: 2022-04-19
Payer: COMMERCIAL

## 2022-04-19 VITALS
OXYGEN SATURATION: 98 % | HEIGHT: 71 IN | DIASTOLIC BLOOD PRESSURE: 80 MMHG | HEART RATE: 76 BPM | WEIGHT: 192 LBS | BODY MASS INDEX: 26.88 KG/M2 | SYSTOLIC BLOOD PRESSURE: 122 MMHG

## 2022-04-19 DIAGNOSIS — I45.6 WOLFF-PARKINSON-WHITE SYNDROME: Primary | ICD-10-CM

## 2022-04-19 PROCEDURE — 99203 OFFICE O/P NEW LOW 30 MIN: CPT | Performed by: INTERNAL MEDICINE

## 2022-04-19 PROCEDURE — 93005 ELECTROCARDIOGRAM TRACING: CPT

## 2022-04-19 PROCEDURE — 93000 ELECTROCARDIOGRAM COMPLETE: CPT | Performed by: INTERNAL MEDICINE

## 2022-04-19 NOTE — PROGRESS NOTES
"[facility]  Consultation H&P  Rj Rincon  1980  [phone] (work).    VISIT DATE:  04/19/2022    PCP: Ramya Brannon  [address] [place] [id number]    CC:  Chief Complaint   Patient presents with   • Priscilla-Parkinson-White Syndrome   • Chest Pain   • Dizziness     PROBLEM LIST:  A. 5/9/2012 12 lead EKG revealing Priscilla Parkinson-White Pattern at 78 beats per minute  B. No previous cardiac evaluation   C. EPS 3/31/17 with mid-septal pathway antegrade block point 240 ms with no inducible arrhythmias, and sensitive to catheter pressure, no ablation attempted  D. Flecainide initiated 3/31/17-      ASSESSMENT:   Diagnosis Plan   1. Priscilla-Parkinson-White syndrome           PLAN:  WPW:  Currently no symptoms concerning for tach arrhythmias.  Recent episodes of precordial pain appear consistent with musculoskeletal etiology.  Offered reassurance.  Will arrange 2-week amatory ECG monitor if symptoms return.    Hyperlipidemia: Previous coronary calcium score is 0. Continue dietary modifications and regular exercise.  Repeat fasting lipid panel in 6 months.    History of Present Illness   42-year-old gentleman with a history of WPW presenting with left-sided chest discomfort.  Describes twinges of sharp shooting left-sided upper anterior chest wall pain lasting seconds duration.  No obvious triggers.  Denies tachypalpitations.  No presyncope or syncope.  Symptoms have now resolved.  Has a very physical job.  Chest wall is nontender to palpation.  Blood pressures running less than 130/80 mmHg.  Episodes of chest discomfort increased his anxiety.  He stopped taking his flecainide about 3 years ago.    PHYSICAL EXAMINATION:  Vitals:    04/19/22 1444   BP: 122/80   BP Location: Left arm   Patient Position: Sitting   Pulse: 76   SpO2: 98%   Weight: 87.1 kg (192 lb)   Height: 180.3 cm (71\")     General Appearance:    Alert, cooperative, no distress, appears " stated age   Head:    Normocephalic, without obvious abnormality, atraumatic   Eyes:    conjunctiva/corneas clear, EOM's intact, fundi     benign, both eyes   Ears:    Normal TM's and external ear canals, both ears   Nose:   Nares normal, septum midline, mucosa normal, no drainage    or sinus tenderness   Throat:   Lips, mucosa, and tongue normal; teeth and gums normal   Neck:   Supple, symmetrical, trachea midline, no adenopathy;     thyroid:  no enlargement/tenderness/nodules; no carotid    bruit or JVD   Back:     Symmetric, no curvature, ROM normal, no CVA tenderness   Lungs:     Clear to auscultation bilaterally, respirations unlabored   Chest Wall:    No tenderness or deformity    Heart:    Regular rate and rhythm, S1 and S2 normal, no murmur, rub   or gallop, normal carotid impulse bilaterally without bruit.   Abdomen:     Soft, non-tender, bowel sounds active all four quadrants,     no masses, no organomegaly   Extremities:   Extremities normal, atraumatic, no cyanosis or edema   Pulses:   2+ and symmetric all extremities   Skin:   Skin color, texture, turgor normal, no rashes or lesions   Lymph nodes:   Cervical, supraclavicular, and axillary nodes normal   Neurologic:   normal strength, sensation intact     throughout       Diagnostic Data:    ECG 12 Lead    Date/Time: 4/19/2022 2:51 PM  Performed by: Zurdo Allen III, MD  Authorized by: Zurdo Allen III, MD   Comparison: compared with previous ECG from 4/11/2019  Comparison to previous ECG: Preexcitation was evident  Rhythm: sinus rhythm    Clinical impression: abnormal EKG          Lab Results   Component Value Date    CHLPL 191 04/10/2019    TRIG 116 04/10/2019    HDL 37 (L) 04/10/2019     Lab Results   Component Value Date    GLUCOSE 98 03/30/2017    BUN 13 03/30/2017    CREATININE 1.20 03/30/2017     03/30/2017    K 4.0 03/30/2017     03/30/2017    CO2 29.0 03/30/2017     No results found for: HGBA1C  Lab Results   Component Value Date     WBC 6.66 03/30/2017    HGB 14.7 03/30/2017    HCT 42.8 03/30/2017     03/30/2017       PROBLEM LIST:  Patient Active Problem List   Diagnosis   • Priscilla-Parkinson-White syndrome   • Seasonal allergies   • Migraine headache   • Chest pain   • Mixed hyperlipidemia       PAST MEDICAL HX  Past Medical History:   Diagnosis Date   • Arrhythmia    • Chest pain     left sided - radiating down left arm    • Migraine headache    • Seasonal allergies     with allergy shots    • Priscilla-Parkinson-White syndrome        Allergies  No Known Allergies    Current Medications    Current Outpatient Medications:   •  acetaminophen (TYLENOL) 325 MG tablet, Take 650 mg by mouth As Needed for Mild Pain ., Disp: , Rfl:   •  Ascorbic Acid (VITAMIN C PO), Take 1 tablet by mouth As Needed., Disp: , Rfl:   •  fexofenadine (ALLEGRA) 180 MG tablet, Take 180 mg by mouth Daily., Disp: , Rfl:   •  fluticasone (FLONASE) 50 MCG/ACT nasal spray, 2 sprays into each nostril Daily., Disp: , Rfl:   •  ibuprofen (ADVIL,MOTRIN) 200 MG tablet, Take 200 mg by mouth Every 6 (Six) Hours As Needed for Mild Pain ., Disp: , Rfl:          ROS  ROS      SOCIAL HX  Social History     Socioeconomic History   • Marital status:    Tobacco Use   • Smoking status: Never Smoker   • Smokeless tobacco: Never Used   Vaping Use   • Vaping Use: Never used   Substance and Sexual Activity   • Alcohol use: No   • Drug use: No   • Sexual activity: Defer       FAMILY HX  Family History   Problem Relation Age of Onset   • Other Mother 69        CABG   • Other Father         CABG in his 60's   • No Known Problems Sister              Zurdo Allen III, MD, [facility]

## 2022-12-23 ENCOUNTER — HOSPITAL ENCOUNTER (OUTPATIENT)
Facility: HOSPITAL | Age: 42
Discharge: HOME | End: 2022-12-23

## 2022-12-23 PROCEDURE — 99001 SPECIMEN HANDLING PT-LAB: CPT

## 2023-02-01 ENCOUNTER — OFFICE VISIT (OUTPATIENT)
Dept: INTERNAL MEDICINE | Facility: CLINIC | Age: 43
End: 2023-02-01
Payer: COMMERCIAL

## 2023-02-01 VITALS
HEART RATE: 80 BPM | OXYGEN SATURATION: 100 % | TEMPERATURE: 98 F | DIASTOLIC BLOOD PRESSURE: 84 MMHG | BODY MASS INDEX: 26.18 KG/M2 | WEIGHT: 187 LBS | HEIGHT: 71 IN | RESPIRATION RATE: 17 BRPM | SYSTOLIC BLOOD PRESSURE: 132 MMHG

## 2023-02-01 DIAGNOSIS — Z00.00 ENCOUNTER FOR PREVENTIVE HEALTH EXAMINATION: Primary | ICD-10-CM

## 2023-02-01 DIAGNOSIS — I45.6 WOLFF-PARKINSON-WHITE SYNDROME: ICD-10-CM

## 2023-02-01 DIAGNOSIS — K64.4 EXTERNAL HEMORRHOID: ICD-10-CM

## 2023-02-01 DIAGNOSIS — J30.2 SEASONAL ALLERGIES: ICD-10-CM

## 2023-02-01 DIAGNOSIS — E78.2 MIXED HYPERLIPIDEMIA: ICD-10-CM

## 2023-02-01 DIAGNOSIS — Z11.59 ENCOUNTER FOR HEPATITIS C SCREENING TEST FOR LOW RISK PATIENT: ICD-10-CM

## 2023-02-01 PROCEDURE — 99386 PREV VISIT NEW AGE 40-64: CPT | Performed by: INTERNAL MEDICINE

## 2023-02-01 NOTE — PROGRESS NOTES
Chief Complaint   Patient presents with   • Establish Care     Used to see Salud/Florinda previous   • Annual Exam     Has had 2 Covid shots   • Rectal Bleeding     Thinks may have hemorrhoids, on going problem       Subjective     History of Present Illness   Rj Rincon is a 42 y.o. male presenting for annual physical and to establish care.  Preventive health maintenance was reviewed and discussed today. Vaccines were updated.     Patient was formally following with this clinic a couple of years ago for issues with allergies.  Patient shares that occasionally he requires steroids and antibiotics for sinus symptoms.    More recently, he has had more concerns about rectal bleeding, irritation, and swelling in the external rectum which has been bothering him more and more over the last year.  He has tried stool softeners as well as over-the-counter hemorrhoid medications without sufficient benefit.    On questioning, patient shares that he has also been dealing with diarrhea and loose stools particularly when he goes out to eat.  This has been something he is dealing with over the last several years.  It seems to be related to anxiety.     He did have WPW diagnosis but ablation procedure in the past was unsuccessful.  He is being monitored and denies any recent episodes of palpitations or issues with tachycardia.    The following portions of the patient's history were reviewed and updated as appropriate: allergies, current medications, past family history, past medical history, past social history, past surgical history and problem list.    Review of Systems   Constitutional: Negative for chills, fatigue and fever.   HENT: Negative for congestion, ear pain, rhinorrhea, sinus pressure and sore throat.    Eyes: Negative for visual disturbance.   Respiratory: Negative for cough, chest tightness, shortness of breath and wheezing.    Cardiovascular: Negative for chest pain, palpitations and leg swelling.  "  Gastrointestinal: Negative for abdominal pain, blood in stool, constipation, diarrhea, nausea and vomiting.   Endocrine: Negative for polydipsia and polyuria.   Genitourinary: Negative for dysuria and hematuria.   Musculoskeletal: Negative for arthralgias and back pain.   Skin: Negative for rash.   Neurological: Negative for dizziness, light-headedness, numbness and headaches.   Psychiatric/Behavioral: Negative for dysphoric mood and sleep disturbance. The patient is not nervous/anxious.        No Known Allergies    Past Medical History:   Diagnosis Date   • Arrhythmia    • Chest pain     left sided - radiating down left arm    • Migraine headache    • Seasonal allergies     with allergy shots    • Priscilla-Parkinson-White syndrome        Social History     Socioeconomic History   • Marital status:    Tobacco Use   • Smoking status: Never   • Smokeless tobacco: Never   Vaping Use   • Vaping Use: Never used   Substance and Sexual Activity   • Alcohol use: No   • Drug use: No   • Sexual activity: Defer        Past Surgical History:   Procedure Laterality Date   • CARDIAC ELECTROPHYSIOLOGY PROCEDURE N/A 3/31/2017    Procedure: EP/Ablation;  Surgeon: Antwon Tena MD;  Location: [facility] INVASIVE LOCATION;  Service:    • TONSILLECTOMY  1980       Family History   Problem Relation Age of Onset   • Other Mother 69        CABG   • Other Father         CABG in his 60's   • No Known Problems Sister          Current Outpatient Medications:   •  acetaminophen (TYLENOL) 325 MG tablet, Take 650 mg by mouth As Needed for Mild Pain ., Disp: , Rfl:   •  fexofenadine (ALLEGRA) 180 MG tablet, Take 180 mg by mouth Daily., Disp: , Rfl:   •  ibuprofen (ADVIL,MOTRIN) 200 MG tablet, Take 200 mg by mouth Every 6 (Six) Hours As Needed for Mild Pain ., Disp: , Rfl:     Objective   /84   Pulse 80   Temp 98 °F (36.7 °C)   Resp 17   Ht 180.3 cm (71\")   Wt 84.8 kg (187 lb)   SpO2 100%   BMI 26.08 kg/m²     Physical " Exam  Vitals and nursing note reviewed.   Constitutional:       Appearance: Normal appearance. He is well-developed.   HENT:      Head: Normocephalic and atraumatic.      Right Ear: Tympanic membrane and external ear normal.      Left Ear: Tympanic membrane and external ear normal.      Nose: Nose normal. No congestion.      Mouth/Throat:      Mouth: Mucous membranes are moist.      Pharynx: No oropharyngeal exudate.   Eyes:      General: No scleral icterus.        Right eye: No discharge.      Pupils: Pupils are equal, round, and reactive to light.   Neck:      Thyroid: No thyromegaly.      Vascular: No JVD.   Cardiovascular:      Rate and Rhythm: Normal rate and regular rhythm.      Heart sounds: Normal heart sounds. No murmur heard.    No friction rub.   Pulmonary:      Effort: Pulmonary effort is normal. No respiratory distress.      Breath sounds: Normal breath sounds. No stridor. No wheezing.   Abdominal:      General: Bowel sounds are normal. There is no distension.      Palpations: Abdomen is soft.      Tenderness: There is no abdominal tenderness. There is no guarding.   Genitourinary:     Comments: Deferred  Musculoskeletal:         General: No tenderness. Normal range of motion.      Cervical back: Normal range of motion and neck supple.   Lymphadenopathy:      Cervical: No cervical adenopathy.   Skin:     General: Skin is warm and dry.      Findings: No rash.   Neurological:      Mental Status: He is alert and oriented to person, place, and time.      Cranial Nerves: No cranial nerve deficit.   Psychiatric:         Mood and Affect: Mood normal.         Behavior: Behavior normal.         Thought Content: Thought content normal.         Assessment & Plan   Diagnoses and all orders for this visit:    1. Encounter for preventive health examination (Primary)  -     CBC & Differential  -     Comprehensive Metabolic Panel  -     Lipid Panel    2. Priscilla-Parkinson-White syndrome    3. Mixed hyperlipidemia    4.  Seasonal allergies    5. Encounter for hepatitis C screening test for low risk patient  -     Hepatitis Panel, Acute    6. External hemorrhoid  -     Ambulatory Referral to General Surgery          Discussion Summary:  Patient is a 42 y.o. male presenting for annual physical    1. Preventive Health Maintenance  - Baseline labs are up-to-date or ordered per above.  - Vaccines reviewed and updated  - Preventive health measures were discussed including: healthy diet with increase in fruits and vegetables, regular exercise at least 3 times a week, safe sex practices, avoidance of drugs, tobacco, and alcohol, and regular seatbelt use.    2.  WPW  - Follow-up with cardiology annually.  Patient remains asymptomatic at this time.  - Avoid stimulant medications    3.  Hyperlipidemia  - Follow-up lipid levels.    4.  External hemorrhoid  - 1 cm polyp like lesion with another smaller lesion on the right side of the rectum.  Further evaluation with surgery services recommended.      5.  Chronic diarrhea/constipation  - Patient is nearing age 43 and would benefit from colon cancer screening along with further evaluation of the gut with colonoscopy.  This can be discussed and considered with surgeon.        Follow up:  No follow-ups on file.     Patient Instructions:  Patient instructions were provided.

## 2023-02-03 ENCOUNTER — HOSPITAL ENCOUNTER (OUTPATIENT)
Facility: HOSPITAL | Age: 43
Discharge: HOME | End: 2023-02-03
Payer: COMMERCIAL

## 2023-02-03 LAB
A/G RATIO: 1.8 (ref 0.8–2)
ALBUMIN SERPL-MCNC: 4.6 G/DL (ref 3.4–4.8)
ALP BLD-CCNC: 68 U/L (ref 25–100)
ALT SERPL-CCNC: 20 U/L (ref 4–36)
ANION GAP SERPL CALCULATED.3IONS-SCNC: 7 MMOL/L (ref 3–16)
AST SERPL-CCNC: 19 U/L (ref 8–33)
BASOPHILS ABSOLUTE: 0 K/UL (ref 0–0.1)
BASOPHILS RELATIVE PERCENT: 0.5 %
BILIRUB SERPL-MCNC: 0.3 MG/DL (ref 0.3–1.2)
BUN BLDV-MCNC: 13 MG/DL (ref 6–20)
CALCIUM SERPL-MCNC: 9.7 MG/DL (ref 8.5–10.5)
CHLORIDE BLD-SCNC: 102 MMOL/L (ref 98–107)
CHOLESTEROL, TOTAL: 193 MG/DL (ref 0–200)
CO2: 30 MMOL/L (ref 20–30)
CREAT SERPL-MCNC: 1.2 MG/DL (ref 0.4–1.2)
EOSINOPHILS ABSOLUTE: 0.3 K/UL (ref 0–0.4)
EOSINOPHILS RELATIVE PERCENT: 3.2 %
GFR SERPL CREATININE-BSD FRML MDRD: >60 ML/MIN/{1.73_M2}
GLOBULIN: 2.5 G/DL
GLUCOSE BLD-MCNC: 108 MG/DL (ref 74–106)
HCT VFR BLD CALC: 44.1 % (ref 40–54)
HDLC SERPL-MCNC: 42 MG/DL (ref 40–60)
HEMOGLOBIN: 15.2 G/DL (ref 13–18)
IMMATURE GRANULOCYTES #: 0 K/UL
IMMATURE GRANULOCYTES %: 0.2 % (ref 0–5)
LDL CHOLESTEROL CALCULATED: 130 MG/DL
LYMPHOCYTES ABSOLUTE: 2.2 K/UL (ref 1.5–4)
LYMPHOCYTES RELATIVE PERCENT: 27.7 %
MCH RBC QN AUTO: 29.1 PG (ref 27–32)
MCHC RBC AUTO-ENTMCNC: 34.5 G/DL (ref 31–35)
MCV RBC AUTO: 84.3 FL (ref 80–100)
MONOCYTES ABSOLUTE: 0.6 K/UL (ref 0.2–0.8)
MONOCYTES RELATIVE PERCENT: 7.4 %
NEUTROPHILS ABSOLUTE: 4.9 K/UL (ref 2–7.5)
NEUTROPHILS RELATIVE PERCENT: 61 %
PDW BLD-RTO: 12.6 % (ref 11–16)
PLATELET # BLD: 186 K/UL (ref 150–400)
PMV BLD AUTO: 8.6 FL (ref 6–10)
POTASSIUM SERPL-SCNC: 5.1 MMOL/L (ref 3.4–5.1)
RBC # BLD: 5.23 M/UL (ref 4.5–6)
SODIUM BLD-SCNC: 139 MMOL/L (ref 136–145)
TOTAL PROTEIN: 7.1 G/DL (ref 6.4–8.3)
TRIGL SERPL-MCNC: 107 MG/DL (ref 0–249)
VLDLC SERPL CALC-MCNC: 21 MG/DL
WBC # BLD: 8.1 K/UL (ref 4–11)

## 2023-02-03 PROCEDURE — 85025 COMPLETE CBC W/AUTO DIFF WBC: CPT

## 2023-02-03 PROCEDURE — 36415 COLL VENOUS BLD VENIPUNCTURE: CPT

## 2023-02-03 PROCEDURE — 80053 COMPREHEN METABOLIC PANEL: CPT

## 2023-02-03 PROCEDURE — 80074 ACUTE HEPATITIS PANEL: CPT

## 2023-02-03 PROCEDURE — 80061 LIPID PANEL: CPT

## 2023-02-04 LAB
HAV IGM SER IA-ACNC: NORMAL
HEPATITIS B CORE IGM ANTIBODY: NORMAL
HEPATITIS B SURFACE ANTIGEN INTERPRETATION: NORMAL
HEPATITIS C ANTIBODY INTERPRETATION: NORMAL

## 2023-02-08 NOTE — PROGRESS NOTES
Please let the patient know that I reviewed his labs.  All are in normal range.      Hepatitis panel had not yet resulted and may need follow-up with the lab for the final results.    LDL cholesterol was mildly elevated and I would suggest diet control is best to bring this down.

## 2023-02-10 NOTE — PROGRESS NOTES
Patient: Rj Rincon    YOB: 1980    Date: 02/15/2023    Primary Care Provider: Vel Ramirez DO    Chief Complaint   Patient presents with   • Initial Evaluation     Hemorrhoids        SUBJECTIVE:    History of present illness:  I saw the patient in the office today as a consultation for evaluation and treatment of rectal pain which he attributes to hemorrhoids.    He does give a history of blood per rectum and pain with defecation.  He does have a history significant for constipation and alternating bowel movements of diarrhea.  He has a bulge in the right side of the rectal area intermittently.    He does have a distant history of Priscilla-Parkinson-White syndrome, he has had attempted ablation in the past but they were not successful with this.  He currently is under cardiologist care, he does not require any medications, and has not had any symptomatology lately.  The last time he had some tachycardia and anxiety was last spring, at that time he did not require any treatment and he saw his cardiologist afterwards and was told that he needed no further intervention.  He last saw his cardiologist for WPW last year and is seen on a yearly basis.    The following portions of the patient's history were reviewed and updated as appropriate: allergies, current medications, past family history, past medical history, past social history, past surgical history and problem list.    Review of Systems   Constitutional: Negative for chills, fever and unexpected weight change.   HENT: Negative for trouble swallowing and voice change.    Eyes: Negative for visual disturbance.   Respiratory: Negative for apnea, cough, chest tightness, shortness of breath and wheezing.    Cardiovascular: Negative for chest pain, palpitations and leg swelling.   Gastrointestinal: Positive for rectal pain. Negative for abdominal distention, abdominal pain, anal bleeding, blood in stool, constipation, diarrhea, nausea and  vomiting.   Endocrine: Negative for cold intolerance and heat intolerance.   Genitourinary: Negative for difficulty urinating, dysuria, flank pain, scrotal swelling and testicular pain.   Musculoskeletal: Negative for back pain, gait problem and joint swelling.   Skin: Negative for color change, rash and wound.   Neurological: Negative for dizziness, syncope, speech difficulty, weakness, numbness and headaches.   Hematological: Negative for adenopathy. Does not bruise/bleed easily.   Psychiatric/Behavioral: Negative for confusion. The patient is not nervous/anxious.        History:  Past Medical History:   Diagnosis Date   • Arrhythmia    • Chest pain     left sided - radiating down left arm    • Fissure, anal 2009   • Migraine headache    • Rectal bleeding 2009    Not constant, only when i have trouble with constipation   • Seasonal allergies     with allergy shots    • Priscilla-Parkinson-White syndrome        Past Surgical History:   Procedure Laterality Date   • CARDIAC ELECTROPHYSIOLOGY PROCEDURE N/A 03/31/2017    Procedure: EP/Ablation;  Surgeon: Antwon Tena MD;  Location: [facility] INVASIVE LOCATION;  Service:    • TONSILLECTOMY  1980       Family History   Problem Relation Age of Onset   • Other Mother 69        CABG   • Cancer Mother         Breast   • Other Father         CABG in his 60's   • Heart disease Father    • No Known Problems Sister        Social History     Tobacco Use   • Smoking status: Never   • Smokeless tobacco: Never   Vaping Use   • Vaping Use: Never used   Substance Use Topics   • Alcohol use: No   • Drug use: No       Allergies:  No Known Allergies    Medications:    Current Outpatient Medications:   •  acetaminophen (TYLENOL) 325 MG tablet, Take 650 mg by mouth As Needed for Mild Pain ., Disp: , Rfl:   •  fexofenadine (ALLEGRA) 180 MG tablet, Take 180 mg by mouth Daily., Disp: , Rfl:   •  ibuprofen (ADVIL,MOTRIN) 200 MG tablet, Take 200 mg by mouth Every 6 (Six) Hours As Needed for  "Mild Pain ., Disp: , Rfl:     OBJECTIVE:    Vital Signs:   Vitals:    02/15/23 1522   BP: 148/92   BP Location: Left arm   Patient Position: Sitting   Cuff Size: Adult   Pulse: 96   Resp: 16   Temp: 97.4 °F (36.3 °C)   TempSrc: Temporal   SpO2: 97%   Weight: 84.4 kg (186 lb)   Height: 180.3 cm (71\")       Physical Exam:   General Appearance:    Alert, cooperative, in no acute distress   Head:    Normocephalic, without obvious abnormality, atraumatic   Eyes:            Lids and lashes normal, conjunctivae and sclerae normal, no   icterus, no pallor, corneas clear, PERRL   Ears:    Ears appear intact with no abnormalities noted   Throat:   No oral lesions, no thrush, oral mucosa moist   Neck:   No adenopathy, supple, trachea midline, no thyromegaly,  no JVD   Lungs:     Clear to auscultation,respirations regular, even and                  unlabored    Heart:    Regular rhythm and normal rate, normal S1 and S2, no            murmur   Abdomen:     no masses, no organomegaly, soft non-tender, non-distended, no guarding, there is no evidence of tenderness   Extremities:   Moves all extremities well, no edema, no cyanosis, no             redness   Pulses:   Pulses palpable and equal bilaterally   Skin:   No bleeding, bruising or rash   Lymph nodes:   No palpable adenopathy   Neurologic:   Cranial nerves 2 - 12 grossly intact, sensation intact      Results Review:   I reviewed the patient's new clinical results.  I reviewed the patient's new imaging results and agree with the interpretation.  I reviewed the patient's other test results and agree with the interpretation    Review of Systems was reviewed and confirmed as accurate as documented by the MA.    ASSESSMENT/PLAN:    1. Rectal bleed    2. Right lower quadrant abdominal pain    3. Grade I hemorrhoids    4. Anal fissure        I recommend a colonoscopy for further evaluation. The procedure was explained as well as the risks which include but are not limited to " bleeding, infection, perforation, abdominal pain etc. The patient understands these risks and the procedure and wishes to proceed.     I have given the patient instructions with regards to fiber supplementation to see if this helps his bowel habit changes and thus his anal fissure.    Electronically signed by Tra Magaña MD  02/15/23 13:46 EST

## 2023-02-13 ENCOUNTER — TELEPHONE (OUTPATIENT)
Dept: INTERNAL MEDICINE | Facility: CLINIC | Age: 43
End: 2023-02-13
Payer: COMMERCIAL

## 2023-02-13 NOTE — TELEPHONE ENCOUNTER
Caller: Rj Rincon    Relationship: Self    Best call back number: [phone]    Caller requesting test results: PATIENT    What test was performed: LABS    When was the test performed: ONE WEEK AGO    Where was the test performed: [facility]

## 2023-02-15 ENCOUNTER — OFFICE VISIT (OUTPATIENT)
Dept: SURGERY | Facility: CLINIC | Age: 43
End: 2023-02-15
Payer: COMMERCIAL

## 2023-02-15 VITALS
TEMPERATURE: 97.4 F | HEIGHT: 71 IN | WEIGHT: 186 LBS | DIASTOLIC BLOOD PRESSURE: 92 MMHG | SYSTOLIC BLOOD PRESSURE: 148 MMHG | RESPIRATION RATE: 16 BRPM | OXYGEN SATURATION: 97 % | HEART RATE: 96 BPM | BODY MASS INDEX: 26.04 KG/M2

## 2023-02-15 DIAGNOSIS — R10.31 RIGHT LOWER QUADRANT ABDOMINAL PAIN: ICD-10-CM

## 2023-02-15 DIAGNOSIS — K60.2 ANAL FISSURE: ICD-10-CM

## 2023-02-15 DIAGNOSIS — K62.5 RECTAL BLEED: Primary | ICD-10-CM

## 2023-02-15 DIAGNOSIS — K64.0 GRADE I HEMORRHOIDS: ICD-10-CM

## 2023-02-15 PROCEDURE — 99204 OFFICE O/P NEW MOD 45 MIN: CPT | Performed by: SURGERY

## 2023-02-15 RX ORDER — BISACODYL 5 MG
TABLET, DELAYED RELEASE (ENTERIC COATED) ORAL
Qty: 4 TABLET | Refills: 0 | Status: SHIPPED | OUTPATIENT
Start: 2023-02-15

## 2023-02-15 RX ORDER — POLYETHYLENE GLYCOL 3350 17 G/17G
238 POWDER, FOR SOLUTION ORAL ONCE
Qty: 238 G | Refills: 0 | Status: SHIPPED | OUTPATIENT
Start: 2023-02-15 | End: 2023-02-15

## 2023-02-28 ENCOUNTER — TELEPHONE (OUTPATIENT)
Dept: SURGERY | Facility: CLINIC | Age: 43
End: 2023-02-28
Payer: COMMERCIAL

## 2023-02-28 NOTE — TELEPHONE ENCOUNTER
Called patient confirmed colonoscopy, 03/03/23, Dr Magaña @ [facility]

## 2023-03-03 ENCOUNTER — OUTSIDE FACILITY SERVICE (OUTPATIENT)
Dept: SURGERY | Facility: CLINIC | Age: 43
End: 2023-03-03
Payer: COMMERCIAL

## 2023-03-03 PROCEDURE — 45385 COLONOSCOPY W/LESION REMOVAL: CPT | Performed by: SURGERY

## 2023-04-27 ENCOUNTER — OFFICE VISIT (OUTPATIENT)
Dept: CARDIOLOGY | Facility: CLINIC | Age: 43
End: 2023-04-27
Payer: COMMERCIAL

## 2023-04-27 VITALS
DIASTOLIC BLOOD PRESSURE: 64 MMHG | HEART RATE: 78 BPM | WEIGHT: 184 LBS | HEIGHT: 71 IN | OXYGEN SATURATION: 97 % | BODY MASS INDEX: 25.76 KG/M2 | SYSTOLIC BLOOD PRESSURE: 104 MMHG

## 2023-04-27 DIAGNOSIS — I45.6 WOLFF-PARKINSON-WHITE SYNDROME: Primary | ICD-10-CM

## 2023-04-27 DIAGNOSIS — E78.2 MIXED HYPERLIPIDEMIA: ICD-10-CM

## 2023-04-27 PROCEDURE — 99213 OFFICE O/P EST LOW 20 MIN: CPT | Performed by: INTERNAL MEDICINE

## 2023-04-27 NOTE — PROGRESS NOTES
[facility] Cardiology  Office visit  Rj Rincon  1980  [phone]  [phone] (work)    VISIT DATE:  4/27/2023    PCP: Vel Ramirez DO  [address] [facility] [address]  [facility] [address]    CC:  Chief Complaint   Patient presents with   • Priscilla-Parkinson-White Syndrome     PROBLEM LIST:  A. 5/9/2012 12 lead EKG revealing Priscilla Parkinson-White Pattern at 78 beats per minute  B. No previous cardiac evaluation   C. EPS 3/31/17 with mid-septal pathway antegrade block point 240 ms with no inducible arrhythmias, and sensitive to catheter pressure, no ablation attempted  D. Flecainide initiated 3/31/17      ASSESSMENT:   Diagnosis Plan   1. Priscilla-Parkinson-White syndrome        2. Mixed hyperlipidemia            PLAN:  WPW:  Currently no symptoms concerning for recurrent tach arrhythmias.  Will arrange 2-week amatory ECG monitor if symptoms return.     Hyperlipidemia: Previous coronary calcium score is 0. Continue dietary modifications and regular exercise.      Subjective  Interval evaluation.  No exertional chest pain or dyspnea.  Preserved function capacity.  No sustained palpitations.  Rare episodes of intermittent brief lightheadedness which can induce anxiety can trigger some paresthesias in his fingertips and left arm.  No obvious triggers.    Initial evaluation: 42-year-old gentleman with a history of WPW presenting with left-sided chest discomfort.  Describes twinges of sharp shooting left-sided upper anterior chest wall pain lasting seconds duration.  No obvious triggers.  Denies tachypalpitations.  No presyncope or syncope.  Symptoms have now resolved.  Has a very physical job.  Chest wall is nontender to palpation.  Blood pressures running less than 130/80 mmHg.  Episodes of chest discomfort increased his anxiety.  He stopped taking his flecainide about 3 years ago.    PHYSICAL EXAMINATION:  Vitals:    04/27/23 1529   BP: 104/64   BP Location: Right arm   Patient  "Position: Sitting   Pulse: 78   SpO2: 97%   Weight: 83.5 kg (184 lb)   Height: 180.3 cm (71\")     General Appearance:    Alert, cooperative, no distress, appears stated age   Head:    Normocephalic, without obvious abnormality, atraumatic   Eyes:    conjunctiva/corneas clear   Nose:   Nares normal, septum midline, mucosa normal, no drainage   Throat:   Lips, teeth and gums normal   Neck:   Supple, symmetrical, trachea midline, no carotid    bruit or JVD   Lungs:     Clear to auscultation bilaterally, respirations unlabored   Chest Wall:    No tenderness or deformity    Heart:    Regular rate and rhythm, S1 and S2 normal, no murmur, rub   or gallop, normal carotid impulse bilaterally without bruit.   Abdomen:     Soft, non-tender   Extremities:   Extremities normal, atraumatic, no cyanosis or edema   Pulses:   2+ and symmetric all extremities   Skin:   Skin color, texture, turgor normal, no rashes or lesions       Diagnostic Data:  Procedures  Lab Results   Component Value Date    CHLPL 193 02/03/2023    TRIG 107 02/03/2023    HDL 42 02/03/2023     Lab Results   Component Value Date    GLUCOSE 98 03/30/2017    BUN 13 03/30/2017    CREATININE 1.20 03/30/2017     03/30/2017    K 4.0 03/30/2017     03/30/2017    CO2 29.0 03/30/2017     No results found for: HGBA1C  Lab Results   Component Value Date    WBC 8.1 02/03/2023    HGB 15.2 02/03/2023    HCT 44.1 02/03/2023     02/03/2023       Allergies  No Known Allergies    Current Medications    Current Outpatient Medications:   •  acetaminophen (TYLENOL) 325 MG tablet, Take 2 tablets by mouth As Needed for Mild Pain., Disp: , Rfl:   •  esomeprazole (nexIUM 24HR) 20 MG capsule, Take 1 capsule by mouth Every Morning Before Breakfast., Disp: , Rfl:   •  fexofenadine (ALLEGRA) 180 MG tablet, Take 1 tablet by mouth Daily., Disp: , Rfl:   •  ibuprofen (ADVIL,MOTRIN) 200 MG tablet, Take 1 tablet by mouth Every 6 (Six) Hours As Needed for Mild Pain., Disp: , " Rfl:           ROS  ROS      SOCIAL HX  Social History     Socioeconomic History   • Marital status:    Tobacco Use   • Smoking status: Never   • Smokeless tobacco: Never   Vaping Use   • Vaping Use: Never used   Substance and Sexual Activity   • Alcohol use: No   • Drug use: No   • Sexual activity: Yes     Partners: Female       FAMILY HX  Family History   Problem Relation Age of Onset   • Other Mother 69        CABG   • Cancer Mother         Breast   • Other Father         CABG in his 60's   • Heart disease Father    • No Known Problems Sister              Zurdo Allen III, MD, FACC

## 2023-08-10 ENCOUNTER — HOSPITAL ENCOUNTER (OUTPATIENT)
Facility: HOSPITAL | Age: 43
Discharge: HOME | End: 2023-08-10
Payer: COMMERCIAL

## 2023-08-10 PROCEDURE — 99001 SPECIMEN HANDLING PT-LAB: CPT

## 2024-02-07 ENCOUNTER — OFFICE VISIT (OUTPATIENT)
Dept: INTERNAL MEDICINE | Facility: CLINIC | Age: 44
End: 2024-02-07
Payer: COMMERCIAL

## 2024-02-07 VITALS
OXYGEN SATURATION: 98 % | WEIGHT: 195 LBS | BODY MASS INDEX: 27.3 KG/M2 | TEMPERATURE: 98.6 F | HEIGHT: 71 IN | SYSTOLIC BLOOD PRESSURE: 129 MMHG | HEART RATE: 76 BPM | DIASTOLIC BLOOD PRESSURE: 72 MMHG | RESPIRATION RATE: 16 BRPM

## 2024-02-07 DIAGNOSIS — Z23 NEED FOR VACCINATION: ICD-10-CM

## 2024-02-07 DIAGNOSIS — Z00.00 ENCOUNTER FOR PREVENTIVE HEALTH EXAMINATION: Primary | ICD-10-CM

## 2024-02-07 DIAGNOSIS — E78.2 MIXED HYPERLIPIDEMIA: ICD-10-CM

## 2024-02-07 DIAGNOSIS — I45.6 WOLFF-PARKINSON-WHITE SYNDROME: ICD-10-CM

## 2024-02-07 DIAGNOSIS — K21.9 GASTROESOPHAGEAL REFLUX DISEASE WITHOUT ESOPHAGITIS: ICD-10-CM

## 2024-02-07 RX ORDER — ESOMEPRAZOLE MAGNESIUM 40 MG/1
40 CAPSULE, DELAYED RELEASE ORAL
Qty: 30 CAPSULE | Refills: 2 | Status: SHIPPED | OUTPATIENT
Start: 2024-02-07

## 2024-02-07 NOTE — PROGRESS NOTES
Chief Complaint   Patient presents with   • Annual Exam   • Heartburn     Is getting worse-coming up at night into throat.  Nexium daily       Subjective     History of Present Illness   Rj Rincon is a 43 y.o. male presenting for annual physical.  Preventive health maintenance was reviewed and discussed today. Vaccines were updated.     The following portions of the patient's history were reviewed and updated as appropriate: allergies, current medications, past family history, past medical history, past social history, past surgical history and problem list.    Review of Systems   Constitutional:  Negative for chills, fatigue and fever.   HENT:  Negative for congestion, ear pain, rhinorrhea, sinus pressure and sore throat.    Eyes:  Negative for visual disturbance.   Respiratory:  Negative for cough, chest tightness, shortness of breath and wheezing.    Cardiovascular:  Negative for chest pain, palpitations and leg swelling.   Gastrointestinal:  Negative for abdominal pain, blood in stool, constipation, diarrhea, nausea and vomiting.   Endocrine: Negative for polydipsia and polyuria.   Genitourinary:  Negative for dysuria and hematuria.   Musculoskeletal:  Negative for arthralgias and back pain.   Skin:  Negative for rash.   Neurological:  Negative for dizziness, light-headedness, numbness and headaches.   Psychiatric/Behavioral:  Negative for dysphoric mood and sleep disturbance. The patient is not nervous/anxious.        No Known Allergies    Past Medical History:   Diagnosis Date   • Arrhythmia    • Chest pain     left sided - radiating down left arm    • Fissure, anal 2009   • Migraine headache    • Rectal bleeding 2009    Not constant, only when i have trouble with constipation   • Seasonal allergies     with allergy shots    • Priscilla-Parkinson-White syndrome        Social History     Socioeconomic History   • Marital status:    Tobacco Use   • Smoking status: Never   • Smokeless tobacco: Never  "  Vaping Use   • Vaping Use: Never used   Substance and Sexual Activity   • Alcohol use: No   • Drug use: No   • Sexual activity: Yes     Partners: Female        Past Surgical History:   Procedure Laterality Date   • CARDIAC ELECTROPHYSIOLOGY PROCEDURE N/A 03/31/2017    Procedure: EP/Ablation;  Surgeon: Antwon Tena MD;  Location: [facility] INVASIVE LOCATION;  Service:    • COLONOSCOPY  03/2023   • TONSILLECTOMY  1980       Family History   Problem Relation Age of Onset   • Other Mother 69        CABG   • Cancer Mother         Breast   • Other Father         CABG in his 60's   • Heart disease Father    • No Known Problems Sister          Current Outpatient Medications:   •  acetaminophen (TYLENOL) 325 MG tablet, Take 2 tablets by mouth As Needed for Mild Pain., Disp: , Rfl:   •  Ascorbic Acid (ALYSSA-C PO), Take  by mouth. Emergency C pack daily, Disp: , Rfl:   •  esomeprazole (nexIUM) 40 MG capsule, Take 1 capsule by mouth Every Morning Before Breakfast., Disp: 30 capsule, Rfl: 2  •  fexofenadine (ALLEGRA) 180 MG tablet, Take 1 tablet by mouth Daily. Rotate with Zyrtec, Disp: , Rfl:   •  ibuprofen (ADVIL,MOTRIN) 200 MG tablet, Take 1 tablet by mouth Every 6 (Six) Hours As Needed for Mild Pain., Disp: , Rfl:   •  Misc Natural Products (FIBER 7 PO), Take  by mouth., Disp: , Rfl:     Objective   /72   Pulse 76   Temp 98.6 °F (37 °C)   Resp 16   Ht 180.3 cm (71\")   Wt 88.5 kg (195 lb)   SpO2 98%   BMI 27.20 kg/m²     Physical Exam  Vitals and nursing note reviewed.   Constitutional:       Appearance: Normal appearance. He is well-developed.   HENT:      Head: Normocephalic and atraumatic.      Right Ear: Tympanic membrane and external ear normal.      Left Ear: Tympanic membrane and external ear normal.      Nose: Nose normal. No congestion.      Mouth/Throat:      Mouth: Mucous membranes are moist.      Pharynx: No oropharyngeal exudate.   Eyes:      General: No scleral icterus.        Right eye: No " discharge.      Pupils: Pupils are equal, round, and reactive to light.   Neck:      Thyroid: No thyromegaly.      Vascular: No JVD.   Cardiovascular:      Rate and Rhythm: Normal rate and regular rhythm.      Heart sounds: Normal heart sounds. No murmur heard.     No friction rub.   Pulmonary:      Effort: Pulmonary effort is normal. No respiratory distress.      Breath sounds: Normal breath sounds. No stridor. No wheezing.   Abdominal:      General: Bowel sounds are normal. There is no distension.      Palpations: Abdomen is soft.      Tenderness: There is no abdominal tenderness. There is no guarding.   Genitourinary:     Comments: Deferred  Musculoskeletal:         General: No tenderness. Normal range of motion.      Cervical back: Normal range of motion and neck supple.   Lymphadenopathy:      Cervical: No cervical adenopathy.   Skin:     General: Skin is warm and dry.      Findings: No rash.   Neurological:      Mental Status: He is alert and oriented to person, place, and time.      Cranial Nerves: No cranial nerve deficit.   Psychiatric:         Mood and Affect: Mood normal.         Behavior: Behavior normal.         Thought Content: Thought content normal.       Assessment & Plan   Diagnoses and all orders for this visit:    1. Encounter for preventive health examination (Primary)  -     CBC & Differential  -     Comprehensive Metabolic Panel  -     Lipid Panel    2. Gastroesophageal reflux disease without esophagitis  -     Ambulatory Referral to General Surgery  -     esomeprazole (nexIUM) 40 MG capsule; Take 1 capsule by mouth Every Morning Before Breakfast.  Dispense: 30 capsule; Refill: 2    3. Priscilla-Parkinson-White syndrome  -     CBC & Differential  -     Comprehensive Metabolic Panel  -     Lipid Panel    4. Mixed hyperlipidemia          Discussion Summary:  Patient is a 43 y.o. male presenting for annual physical    1. Preventive Health Maintenance  - Baseline labs are up-to-date or ordered per  above.  - Vaccines reviewed and updated  - Preventive health measures were discussed including: healthy diet with increase in fruits and vegetables, regular exercise at least 3 times a week, safe sex practices, avoidance of drugs, tobacco, and alcohol, and regular seatbelt use.    2.       Follow up:  No follow-ups on file.     Patient Instructions:  Patient instructions were provided.

## 2024-02-07 NOTE — PROGRESS NOTES
Chief Complaint   Patient presents with    Annual Exam    Heartburn     Is getting worse-coming up at night into throat.  Nexium daily       Subjective     History of Present Illness   Rj Rincon is a 43 y.o. male presenting for annual physical.  Preventive health maintenance was reviewed and discussed today. Vaccines were updated.     The patient has severe GERD. He was taking Nexium sporadically last year, but now he needs to take it almost every day. The reflux comes up into his throat. It is worse when he is sleeping. It wakes him up from sleep. He does not eat a lot of spicy food. He does eat greasy food. He usually eats by 6:00 PM, but occasionally he does snack at night. The reflux has been going on for a while. If he does not take the Nexium, it is usually a day or 2 and the reflux is back. He has not seen a GI doctor for this. He experienced an episode last night and it shocked him. He had a colonoscopy performed last year by Dr. Magaña. He was told that they removed 1 polyp, he was asked to have a repeat colonoscopy in 3 years. He denies any constipation or diarrhea. He has started a fiber regimen that was suggested last year.     He is also taking Emergen-C packs. He has gained 10 pounds since last year. He has not had any recent issues with Priscilla-Parkinson-White syndrome.    The patient has an appointment with his cardiologist, Dr. Allen, in 04/2024.    He has year-round seasonal allergy and sinus issues. 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 He sees Dr. Allen once a year.    The following portions of the patient's history were reviewed and updated as appropriate: allergies, current medications, past family history, past medical history, past social history, past surgical history and problem list.    Review of Systems   Constitutional:  Negative for chills, fatigue and fever.   HENT:  Negative for congestion, ear pain, rhinorrhea, sinus pressure and sore throat.    Eyes:  Negative for  visual disturbance.   Respiratory:  Negative for cough, chest tightness, shortness of breath and wheezing.    Cardiovascular:  Negative for chest pain, palpitations and leg swelling.   Gastrointestinal:  Negative for abdominal pain, blood in stool, constipation, diarrhea, nausea and vomiting.        GERD   Endocrine: Negative for polydipsia and polyuria.   Genitourinary:  Negative for dysuria and hematuria.   Musculoskeletal:  Negative for arthralgias and back pain.   Skin:  Negative for rash.   Neurological:  Negative for dizziness, light-headedness, numbness and headaches.   Psychiatric/Behavioral:  Negative for dysphoric mood and sleep disturbance. The patient is not nervous/anxious.        No Known Allergies    Past Medical History:   Diagnosis Date    Arrhythmia     Chest pain     left sided - radiating down left arm     Fissure, anal 2009    Migraine headache     Rectal bleeding 2009    Not constant, only when i have trouble with constipation    Seasonal allergies     with allergy shots     Priscilla-Parkinson-White syndrome        Social History     Socioeconomic History    Marital status:    Tobacco Use    Smoking status: Never    Smokeless tobacco: Never   Vaping Use    Vaping Use: Never used   Substance and Sexual Activity    Alcohol use: No    Drug use: No    Sexual activity: Yes     Partners: Female        Past Surgical History:   Procedure Laterality Date    CARDIAC ELECTROPHYSIOLOGY PROCEDURE N/A 03/31/2017    Procedure: EP/Ablation;  Surgeon: Antwon Tena MD;  Location: [facility] INVASIVE LOCATION;  Service:     COLONOSCOPY  03/2023    TONSILLECTOMY  1980       Family History   Problem Relation Age of Onset    Other Mother 69        CABG    Cancer Mother         Breast    Other Father         CABG in his 60's    Heart disease Father     No Known Problems Sister          Current Outpatient Medications:     acetaminophen (TYLENOL) 325 MG tablet, Take 2 tablets by mouth As Needed for Mild Pain.,  "Disp: , Rfl:     Ascorbic Acid (ALYSSA-C PO), Take  by mouth. Emergency C pack daily, Disp: , Rfl:     esomeprazole (nexIUM) 40 MG capsule, Take 1 capsule by mouth Every Morning Before Breakfast., Disp: 30 capsule, Rfl: 2    fexofenadine (ALLEGRA) 180 MG tablet, Take 1 tablet by mouth Daily. Rotate with Zyrtec, Disp: , Rfl:     ibuprofen (ADVIL,MOTRIN) 200 MG tablet, Take 1 tablet by mouth Every 6 (Six) Hours As Needed for Mild Pain., Disp: , Rfl:     Misc Natural Products (FIBER 7 PO), Take  by mouth., Disp: , Rfl:     Objective   /72   Pulse 76   Temp 98.6 °F (37 °C)   Resp 16   Ht 180.3 cm (71\")   Wt 88.5 kg (195 lb)   SpO2 98%   BMI 27.20 kg/m²     Physical Exam  Vitals and nursing note reviewed.   Constitutional:       Appearance: Normal appearance. He is well-developed.   HENT:      Head: Normocephalic and atraumatic.      Right Ear: Tympanic membrane and external ear normal.      Left Ear: Tympanic membrane and external ear normal.      Nose: Nose normal. No congestion.      Mouth/Throat:      Mouth: Mucous membranes are moist.      Pharynx: No oropharyngeal exudate.   Eyes:      General: No scleral icterus.        Right eye: No discharge.      Pupils: Pupils are equal, round, and reactive to light.   Neck:      Thyroid: No thyromegaly.      Vascular: No JVD.   Cardiovascular:      Rate and Rhythm: Normal rate and regular rhythm.      Heart sounds: Normal heart sounds. No murmur heard.     No friction rub.   Pulmonary:      Effort: Pulmonary effort is normal. No respiratory distress.      Breath sounds: Normal breath sounds. No stridor. No wheezing.   Abdominal:      General: Bowel sounds are normal. There is no distension.      Palpations: Abdomen is soft.      Tenderness: There is no abdominal tenderness. There is no guarding.   Genitourinary:     Comments: Deferred  Musculoskeletal:         General: No tenderness. Normal range of motion.      Cervical back: Normal range of motion and neck " supple.   Lymphadenopathy:      Cervical: No cervical adenopathy.   Skin:     General: Skin is warm and dry.      Findings: No rash.   Neurological:      Mental Status: He is alert and oriented to person, place, and time.      Cranial Nerves: No cranial nerve deficit.   Psychiatric:         Mood and Affect: Mood normal.         Behavior: Behavior normal.         Thought Content: Thought content normal.     Weight is 195 pounds    Assessment & Plan   Diagnoses and all orders for this visit:    1. Encounter for preventive health examination (Primary)  -     CBC & Differential  -     Comprehensive Metabolic Panel  -     Lipid Panel    2. Gastroesophageal reflux disease without esophagitis  -     Ambulatory Referral to General Surgery  -     esomeprazole (nexIUM) 40 MG capsule; Take 1 capsule by mouth Every Morning Before Breakfast.  Dispense: 30 capsule; Refill: 2  -     H. Pylori Breath Test - Breath, Lung    3. Priscilla-Parkinson-White syndrome  -     CBC & Differential  -     Comprehensive Metabolic Panel  -     Lipid Panel    4. Mixed hyperlipidemia    5. Need for vaccination  -     Tdap Vaccine => 6yo IM (BOOSTRIX)        Transcribed from ambient dictation for Vel Ramirez DO by Beatrice Muhammad.  02/07/24   18:31 EST    Patient or patient representative verbalized consent to the visit recording.  I have personally performed the services described in this document as transcribed by the above individual, and it is both accurate and complete.      Discussion Summary:  Patient is a 43 y.o. male presenting for annual physical    Preventive Health Maintenance  - Baseline labs are up-to-date or ordered per above.  - Vaccines reviewed and updated - TDAP given today  - Preventive health measures were discussed including: healthy diet with increase in fruits and vegetables, regular exercise at least 3 times a week, safe sex practices, avoidance of drugs, tobacco, and alcohol, and regular seatbelt use.    GERD   I will  increase his Nexium to 40 mg once a day for 30 days. I will refer him to Dr. Magaña for an upper endoscopy. I will order an H. pylori test.    Overweight  His weight has increased from 184 to 195 pounds.  - Weight loss options were discussed in detail including reducing fried, fatty, and sugary foods with diet control. A regular exercise regimen was discussed.  BMI is >= 25 and <30. (Overweight) The following options were offered after discussion;: exercise counseling/recommendations and nutrition counseling/recommendations       Follow up:  No follow-ups on file.     Patient Instructions:  Patient instructions were provided.

## 2024-02-15 ENCOUNTER — OFFICE VISIT (OUTPATIENT)
Dept: SURGERY | Facility: CLINIC | Age: 44
End: 2024-02-15
Payer: COMMERCIAL

## 2024-02-15 VITALS
WEIGHT: 195 LBS | BODY MASS INDEX: 27.3 KG/M2 | TEMPERATURE: 98.6 F | DIASTOLIC BLOOD PRESSURE: 82 MMHG | HEIGHT: 71 IN | HEART RATE: 81 BPM | SYSTOLIC BLOOD PRESSURE: 128 MMHG | OXYGEN SATURATION: 96 %

## 2024-02-15 DIAGNOSIS — K21.00 GASTROESOPHAGEAL REFLUX DISEASE WITH ESOPHAGITIS WITHOUT HEMORRHAGE: Primary | ICD-10-CM

## 2024-02-15 PROCEDURE — 99214 OFFICE O/P EST MOD 30 MIN: CPT | Performed by: SURGERY

## 2024-02-17 ENCOUNTER — LAB (OUTPATIENT)
Dept: LAB | Facility: HOSPITAL | Age: 44
End: 2024-02-17
Payer: COMMERCIAL

## 2024-02-17 LAB
ALBUMIN SERPL-MCNC: 4.9 G/DL (ref 3.5–5.2)
ALBUMIN/GLOB SERPL: 2.1 G/DL
ALP SERPL-CCNC: 70 U/L (ref 39–117)
ALT SERPL W P-5'-P-CCNC: 34 U/L (ref 1–41)
ANION GAP SERPL CALCULATED.3IONS-SCNC: 8 MMOL/L (ref 5–15)
ANISOCYTOSIS BLD QL: NORMAL
AST SERPL-CCNC: 26 U/L (ref 1–40)
BASOPHILS # BLD MANUAL: 0.06 10*3/MM3 (ref 0–0.2)
BASOPHILS NFR BLD MANUAL: 1 % (ref 0–1.5)
BILIRUB SERPL-MCNC: 0.4 MG/DL (ref 0–1.2)
BUN SERPL-MCNC: 11 MG/DL (ref 6–20)
BUN/CREAT SERPL: 9.2 (ref 7–25)
CALCIUM SPEC-SCNC: 9.5 MG/DL (ref 8.6–10.5)
CHLORIDE SERPL-SCNC: 103 MMOL/L (ref 98–107)
CHOLEST SERPL-MCNC: 214 MG/DL (ref 0–200)
CO2 SERPL-SCNC: 28 MMOL/L (ref 22–29)
CREAT SERPL-MCNC: 1.2 MG/DL (ref 0.76–1.27)
DEPRECATED RDW RBC AUTO: 40.8 FL (ref 37–54)
EGFRCR SERPLBLD CKD-EPI 2021: 76 ML/MIN/1.73
EOSINOPHIL # BLD MANUAL: 0.26 10*3/MM3 (ref 0–0.4)
EOSINOPHIL NFR BLD MANUAL: 4.1 % (ref 0.3–6.2)
ERYTHROCYTE [DISTWIDTH] IN BLOOD BY AUTOMATED COUNT: 12.8 % (ref 12.3–15.4)
GLOBULIN UR ELPH-MCNC: 2.3 GM/DL
GLUCOSE SERPL-MCNC: 100 MG/DL (ref 65–99)
HCT VFR BLD AUTO: 46.9 % (ref 37.5–51)
HDLC SERPL-MCNC: 46 MG/DL (ref 40–60)
HGB BLD-MCNC: 15.5 G/DL (ref 13–17.7)
LDLC SERPL CALC-MCNC: 148 MG/DL (ref 0–100)
LDLC/HDLC SERPL: 3.17 {RATIO}
LYMPHOCYTES # BLD MANUAL: 2.58 10*3/MM3 (ref 0.7–3.1)
LYMPHOCYTES NFR BLD MANUAL: 5.2 % (ref 5–12)
MCH RBC QN AUTO: 28.8 PG (ref 26.6–33)
MCHC RBC AUTO-ENTMCNC: 33 G/DL (ref 31.5–35.7)
MCV RBC AUTO: 87.2 FL (ref 79–97)
MICROCYTES BLD QL: NORMAL
MONOCYTES # BLD: 0.33 10*3/MM3 (ref 0.1–0.9)
NEUTROPHILS # BLD AUTO: 3.03 10*3/MM3 (ref 1.7–7)
NEUTROPHILS NFR BLD MANUAL: 48.5 % (ref 42.7–76)
PLAT MORPH BLD: NORMAL
PLATELET # BLD AUTO: 249 10*3/MM3 (ref 140–450)
PMV BLD AUTO: 10.2 FL (ref 6–12)
POTASSIUM SERPL-SCNC: 4.5 MMOL/L (ref 3.5–5.2)
PROT SERPL-MCNC: 7.2 G/DL (ref 6–8.5)
RBC # BLD AUTO: 5.38 10*6/MM3 (ref 4.14–5.8)
SODIUM SERPL-SCNC: 139 MMOL/L (ref 136–145)
TRIGL SERPL-MCNC: 111 MG/DL (ref 0–150)
VARIANT LYMPHS NFR BLD MANUAL: 41.2 % (ref 19.6–45.3)
VLDLC SERPL-MCNC: 20 MG/DL (ref 5–40)
WBC MORPH BLD: NORMAL
WBC NRBC COR # BLD AUTO: 6.25 10*3/MM3 (ref 3.4–10.8)

## 2024-02-17 PROCEDURE — 85025 COMPLETE CBC W/AUTO DIFF WBC: CPT | Performed by: INTERNAL MEDICINE

## 2024-02-17 PROCEDURE — 83013 H PYLORI (C-13) BREATH: CPT | Performed by: INTERNAL MEDICINE

## 2024-02-17 PROCEDURE — 80053 COMPREHEN METABOLIC PANEL: CPT | Performed by: INTERNAL MEDICINE

## 2024-02-17 PROCEDURE — 85007 BL SMEAR W/DIFF WBC COUNT: CPT | Performed by: INTERNAL MEDICINE

## 2024-02-17 PROCEDURE — 80061 LIPID PANEL: CPT | Performed by: INTERNAL MEDICINE

## 2024-02-19 LAB — UREA BREATH TEST QL: NEGATIVE

## 2024-02-27 ENCOUNTER — TELEPHONE (OUTPATIENT)
Dept: SURGERY | Facility: CLINIC | Age: 44
End: 2024-02-27
Payer: COMMERCIAL

## 2024-02-27 NOTE — TELEPHONE ENCOUNTER
Called to confirm EGD,03/01/24, Dr Magaña @ [facility] no answer,left voice message

## 2024-03-01 ENCOUNTER — OUTSIDE FACILITY SERVICE (OUTPATIENT)
Dept: SURGERY | Facility: CLINIC | Age: 44
End: 2024-03-01
Payer: COMMERCIAL

## 2024-03-05 ENCOUNTER — HOSPITAL ENCOUNTER (OUTPATIENT)
Dept: GENERAL RADIOLOGY | Facility: HOSPITAL | Age: 44
Discharge: HOME | End: 2024-03-05
Admitting: SURGERY
Payer: COMMERCIAL

## 2024-03-05 DIAGNOSIS — K21.00 GASTROESOPHAGEAL REFLUX DISEASE WITH ESOPHAGITIS WITHOUT HEMORRHAGE: ICD-10-CM

## 2024-03-05 PROCEDURE — 74246 X-RAY XM UPR GI TRC 2CNTRST: CPT

## 2024-03-13 ENCOUNTER — OFFICE VISIT (OUTPATIENT)
Dept: SURGERY | Facility: CLINIC | Age: 44
End: 2024-03-13
Payer: COMMERCIAL

## 2024-03-13 VITALS
BODY MASS INDEX: 27.3 KG/M2 | HEART RATE: 96 BPM | TEMPERATURE: 98.2 F | OXYGEN SATURATION: 95 % | SYSTOLIC BLOOD PRESSURE: 118 MMHG | WEIGHT: 195 LBS | HEIGHT: 71 IN | DIASTOLIC BLOOD PRESSURE: 68 MMHG

## 2024-03-13 DIAGNOSIS — K21.00 GASTROESOPHAGEAL REFLUX DISEASE WITH ESOPHAGITIS WITHOUT HEMORRHAGE: Primary | ICD-10-CM

## 2024-03-13 PROCEDURE — 99213 OFFICE O/P EST LOW 20 MIN: CPT | Performed by: SURGERY

## 2024-03-13 RX ORDER — LORATADINE AND PSEUDOEPHEDRINE SULFATE 5; 120 MG/1; MG/1
TABLET, EXTENDED RELEASE ORAL
COMMUNITY

## 2024-03-13 NOTE — PROGRESS NOTES
Patient: Rj Rincon    YOB: 1980    Date: 03/13/2024    Primary Care Provider: Vel Ramirez DO    Reason for Consultation: Follow-up EGD    Chief Complaint   Patient presents with    Follow-up     EGD       History of present illness:  I saw the patient in the office today as a followup from their recent EGD which was performed 03/01/2024.  EGD showed a moderate Schatzki ring and an oozing gastric ulcer.  Patient had esophageal dilation performed and biopsies taken at the time of endoscopy showed minimal to focally mild chronic inactive gastritis and mild reactive epithelial changes.  Patient also had upper GI performed 03/05/2024 which showed a small sliding hiatal hernia and trace amount of gastroesophageal reflux.  They state that they have done well and are having no complaints.    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    The following portions of the patient's history were reviewed and updated as appropriate: allergies, current medications, past family history, past medical history, past social history, past surgical history and problem list.    Review of Systems   Constitutional:  Negative for chills, fever and unexpected weight change.   HENT:  Negative for trouble swallowing and voice change.    Eyes:  Negative for visual disturbance.   Respiratory:  Negative for apnea, cough, chest tightness, shortness of breath and wheezing.    Cardiovascular:  Negative for chest pain, palpitations and leg swelling.   Gastrointestinal:  Negative for abdominal distention, abdominal pain, anal bleeding, blood in stool, constipation, diarrhea, nausea, rectal pain and vomiting.   Endocrine: Negative for cold intolerance and heat intolerance.   Genitourinary:  Negative for difficulty urinating, dysuria, flank pain, scrotal swelling and testicular pain.   Musculoskeletal:  Negative for back pain, gait problem and joint swelling.   Skin:  Negative for color  "change, rash and wound.   Neurological:  Negative for dizziness, syncope, speech difficulty, weakness, numbness and headaches.   Hematological:  Negative for adenopathy. Does not bruise/bleed easily.   Psychiatric/Behavioral:  Negative for confusion. The patient is not nervous/anxious.        Vital Signs:   Vitals:    03/13/24 1457   BP: 118/68   Pulse: 96   Temp: 98.2 °F (36.8 °C)   SpO2: 95%   Weight: 88.5 kg (195 lb)   Height: 180.4 cm (71.02\")       Allergies:  No Known Allergies    Medications:    Current Outpatient Medications:     acetaminophen (TYLENOL) 325 MG tablet, Take 2 tablets by mouth As Needed for Mild Pain., Disp: , Rfl:     Ascorbic Acid (ALYSSA-C PO), Take  by mouth. Emergency C pack daily, Disp: , Rfl:     esomeprazole (nexIUM) 40 MG capsule, Take 1 capsule by mouth Every Morning Before Breakfast., Disp: 30 capsule, Rfl: 2    fexofenadine (ALLEGRA) 180 MG tablet, Take 1 tablet by mouth Daily. Rotate with Zyrtec, Disp: , Rfl:     ibuprofen (ADVIL,MOTRIN) 200 MG tablet, Take 1 tablet by mouth Every 6 (Six) Hours As Needed for Mild Pain., Disp: , Rfl:     Misc Natural Products (FIBER 7 PO), Take  by mouth., Disp: , Rfl:     loratadine-pseudoephedrine (Claritin-D 12 Hour) 5-120 MG per 12 hr tablet, , Disp: , Rfl:     Physical Exam:   General Appearance:    Alert, cooperative, in no acute distress   Abdomen:     no masses, no organomegaly, soft non-tender, non-distended, no guarding, wounds are well healed, no evidence of recurrent hernia, no peritoneal signs   Chest:      Clear toausculation            Cor:  Regular rate and rhythm      Results Review:   I reviewed the patient's new clinical results.  I reviewed the patient's new imaging results and agree with the interpretation.  I reviewed the patient's other test results and agree with the interpretation    Review of Systems was reviewed and confirmed as accurate as documented by the MA.    Assessment / Plan:    1. Gastroesophageal reflux disease " with esophagitis without hemorrhage        I did discuss the situation with the patient today in the office and they have done well from their recent EGD with biopsy. I have told the patient he needs to stay on his current proton pump inhibitor and then wean himself off of this after 3 months.    He does desire a vasectomy, risk and benefits of operative versus nonoperative intervention have been discussed with the patient, he understands and agrees, and wishes to proceed.  We specifically spoke in the office about the need for sperm count 8 weeks status postvasectomy in order to ensure the degree of sterility he understands that he does not want to have unprotected intercourse prior to the above-mentioned sperm count.    Electronically signed by Tra Magaña MD  03/13/24

## 2024-03-29 ENCOUNTER — OUTSIDE FACILITY SERVICE (OUTPATIENT)
Dept: SURGERY | Facility: CLINIC | Age: 44
End: 2024-03-29
Payer: COMMERCIAL

## 2024-03-29 DIAGNOSIS — G43.909 MIGRAINE WITHOUT STATUS MIGRAINOSUS, NOT INTRACTABLE, UNSPECIFIED MIGRAINE TYPE: Primary | ICD-10-CM

## 2024-03-29 PROCEDURE — 43249 ESOPH EGD DILATION <30 MM: CPT | Performed by: SURGERY

## 2024-03-29 PROCEDURE — 43239 EGD BIOPSY SINGLE/MULTIPLE: CPT | Performed by: SURGERY

## 2024-03-29 RX ORDER — HYDROCODONE BITARTRATE AND ACETAMINOPHEN 7.5; 325 MG/1; MG/1
1 TABLET ORAL EVERY 6 HOURS PRN
Qty: 20 TABLET | Refills: 0 | Status: SHIPPED | OUTPATIENT
Start: 2024-03-29

## 2024-05-08 ENCOUNTER — OFFICE VISIT (OUTPATIENT)
Dept: SURGERY | Facility: CLINIC | Age: 44
End: 2024-05-08
Payer: COMMERCIAL

## 2024-05-08 VITALS — BODY MASS INDEX: 27.18 KG/M2 | HEIGHT: 71 IN | OXYGEN SATURATION: 96 % | TEMPERATURE: 98.4 F | HEART RATE: 109 BPM

## 2024-05-08 DIAGNOSIS — Z98.52 STATUS POST VASECTOMY: Primary | ICD-10-CM

## 2024-05-08 DIAGNOSIS — Z31.41 ENCOUNTER FOR SEMEN ANALYSIS: ICD-10-CM

## 2024-05-08 DIAGNOSIS — Z98.890 POST-OPERATIVE STATE: Primary | ICD-10-CM

## 2024-05-09 ENCOUNTER — OFFICE VISIT (OUTPATIENT)
Dept: CARDIOLOGY | Facility: CLINIC | Age: 44
End: 2024-05-09
Payer: COMMERCIAL

## 2024-05-09 VITALS
BODY MASS INDEX: 27.72 KG/M2 | DIASTOLIC BLOOD PRESSURE: 68 MMHG | HEIGHT: 71 IN | WEIGHT: 198 LBS | SYSTOLIC BLOOD PRESSURE: 128 MMHG | OXYGEN SATURATION: 96 % | HEART RATE: 91 BPM

## 2024-05-09 DIAGNOSIS — E78.2 MIXED HYPERLIPIDEMIA: ICD-10-CM

## 2024-05-09 DIAGNOSIS — I45.6 WOLFF-PARKINSON-WHITE SYNDROME: Primary | ICD-10-CM

## 2024-05-13 DIAGNOSIS — K21.9 GASTROESOPHAGEAL REFLUX DISEASE WITHOUT ESOPHAGITIS: ICD-10-CM

## 2024-05-13 RX ORDER — ESOMEPRAZOLE MAGNESIUM 40 MG/1
40 CAPSULE, DELAYED RELEASE ORAL
Qty: 90 CAPSULE | Refills: 1 | Status: SHIPPED | OUTPATIENT
Start: 2024-05-13

## 2024-05-21 ENCOUNTER — LAB (OUTPATIENT)
Dept: LAB | Facility: HOSPITAL | Age: 44
End: 2024-05-21
Payer: COMMERCIAL

## 2024-05-21 DIAGNOSIS — E78.2 MIXED HYPERLIPIDEMIA: ICD-10-CM

## 2024-05-21 DIAGNOSIS — Z31.41 ENCOUNTER FOR SEMEN ANALYSIS: ICD-10-CM

## 2024-05-21 DIAGNOSIS — Z98.52 STATUS POST VASECTOMY: ICD-10-CM

## 2024-05-21 LAB — SPERM - POST VASECTOMY: NORMAL

## 2024-05-21 PROCEDURE — 36415 COLL VENOUS BLD VENIPUNCTURE: CPT

## 2024-05-21 PROCEDURE — 89321 SEMEN ANAL SPERM DETECTION: CPT

## 2024-05-21 PROCEDURE — 83695 ASSAY OF LIPOPROTEIN(A): CPT

## 2024-05-22 ENCOUNTER — TELEPHONE (OUTPATIENT)
Dept: SURGERY | Facility: CLINIC | Age: 44
End: 2024-05-22
Payer: COMMERCIAL

## 2024-05-23 LAB — LPA SERPL-SCNC: 16 NMOL/L

## 2024-05-24 ENCOUNTER — TELEPHONE (OUTPATIENT)
Dept: CARDIOLOGY | Facility: CLINIC | Age: 44
End: 2024-05-24
Payer: COMMERCIAL

## 2024-05-24 NOTE — TELEPHONE ENCOUNTER
Zurdo Allen III, MD Ethington, Susan D RN  Reassuring LP(a) level.  Notified of message above from . Verbalized understanding.

## 2024-05-24 NOTE — TELEPHONE ENCOUNTER
Zurdo Allen III, MD Ethington, Susan D RN  Excellent calcium score.  Continue current medical therapy.  Notified of message above from . Verbalized understanding.

## 2024-05-30 ENCOUNTER — TELEPHONE (OUTPATIENT)
Dept: SURGERY | Facility: CLINIC | Age: 44
End: 2024-05-30
Payer: COMMERCIAL

## 2024-05-30 RX ORDER — ESOMEPRAZOLE MAGNESIUM 40 MG/1
40 CAPSULE, DELAYED RELEASE ORAL DAILY
Qty: 30 CAPSULE | Refills: 6 | Status: SHIPPED | OUTPATIENT
Start: 2024-05-30 | End: 2025-05-30

## 2024-05-30 NOTE — TELEPHONE ENCOUNTER
"Pt called the office, he stated \"Dr. Magaña told me to take my Nexium for 3 months after my last visit with him and I ran out of it and now I am having really bad acid reflux.\"  Per Dr. Magaña, pt will continue Nexium (new Rx given) and return to the office @ 6 months for follow-up.  "

## 2024-07-23 ENCOUNTER — HOSPITAL ENCOUNTER (OUTPATIENT)
Facility: HOSPITAL | Age: 44
Discharge: HOME | End: 2024-07-23
Payer: COMMERCIAL

## 2024-07-23 PROCEDURE — 99001 SPECIMEN HANDLING PT-LAB: CPT

## 2024-11-20 ENCOUNTER — OFFICE VISIT (OUTPATIENT)
Dept: SURGERY | Facility: CLINIC | Age: 44
End: 2024-11-20
Payer: COMMERCIAL

## 2024-11-20 VITALS
DIASTOLIC BLOOD PRESSURE: 84 MMHG | OXYGEN SATURATION: 97 % | BODY MASS INDEX: 29.54 KG/M2 | TEMPERATURE: 98.2 F | SYSTOLIC BLOOD PRESSURE: 140 MMHG | HEIGHT: 71 IN | WEIGHT: 211 LBS | HEART RATE: 91 BPM

## 2024-11-20 DIAGNOSIS — K21.00 GASTROESOPHAGEAL REFLUX DISEASE WITH ESOPHAGITIS WITHOUT HEMORRHAGE: Primary | ICD-10-CM

## 2024-11-20 DIAGNOSIS — K27.9 PUD (PEPTIC ULCER DISEASE): ICD-10-CM

## 2024-11-20 PROCEDURE — 99214 OFFICE O/P EST MOD 30 MIN: CPT | Performed by: SURGERY

## 2024-11-20 RX ORDER — NAPROXEN 500 MG/1
1 TABLET ORAL EVERY 12 HOURS SCHEDULED
COMMUNITY
Start: 2024-11-06

## 2024-11-20 NOTE — PROGRESS NOTES
Patient: Rj Rincon    YOB: 1980    Date: 11/20/2024    Primary Care Provider: Vel Ramirez DO    Chief Complaint   Patient presents with    Follow-up     Peptic ulcer disease        SUBJECTIVE:    History of present illness:  I saw the patient in the office today as a follow-up consultation for evaluation and treatment of peptic ulcer disease.  Patient's last upper endoscopy with esophageal biopsy and esophageal dilation was performed 03/01/2024, it showed a moderated Schatzki ring and an oozing gastric ulcer.  Pathology report showed minimal to focally mild chronic inactive gastritis and mild reactive epithelial changes.    He did have an upper endoscopy performed in April of this year that showed evidence of an asymptomatic bleeding gastric ulcer that had a clip placed.  He also had a Schatzki's ring that required dilation.  He does have chronic reflux esophagitis with multiple episodes of reflux during the day and especially at night when he reclines, he does have to take proton pump inhibitors.  He did have previous upper GI that showed a small hiatal hernia.    The following portions of the patient's history were reviewed and updated as appropriate: allergies, current medications, past family history, past medical history, past social history, past surgical history and problem list.    Review of Systems   Constitutional:  Negative for chills, fever and unexpected weight change.   HENT:  Negative for trouble swallowing and voice change.    Eyes:  Negative for visual disturbance.   Respiratory:  Negative for apnea, cough, chest tightness, shortness of breath and wheezing.    Cardiovascular:  Negative for chest pain, palpitations and leg swelling.   Gastrointestinal:  Negative for abdominal distention, abdominal pain, anal bleeding, blood in stool, constipation, diarrhea, nausea, rectal pain and vomiting.   Endocrine: Negative for cold intolerance and heat intolerance.   Genitourinary:   Negative for difficulty urinating, dysuria, flank pain, scrotal swelling and testicular pain.   Musculoskeletal:  Negative for back pain, gait problem and joint swelling.   Skin:  Negative for color change, rash and wound.   Neurological:  Negative for dizziness, syncope, speech difficulty, weakness, numbness and headaches.   Hematological:  Negative for adenopathy. Does not bruise/bleed easily.   Psychiatric/Behavioral:  Negative for confusion. The patient is not nervous/anxious.          Review of Systems:  Constitutional:  Negative for chills, fever, and unexpected weight change.  HENT: Negative for trouble swallowing and voice change.  Eyes:  Negative for visual disturbance.  Respiratory:  Negative for apnea, cough, chest tightness, shortness of breath, and wheezing.  Cardiovascular:  Negative for chest pain, palpitations, and leg swelling.  Gastrointestinal:  Negative for abdominal distention, abdominal pain, anal bleeding, blood in stool, constipation, diarrhea, nausea, rectal pain, and vomiting.  Musculoskeletal:  Negative for back pain, gait problem, and joint swelling.  Skin:  Negative for color change, rash, and wound  Neurological:  Negative for dizziness, syncope, speech difficulty, weakness, numbness, and headaches.  Hematological:  Negative for adenopathy.  Does not bruise/bleed easily.  Psychiatric/Behavioral:  Negative for confusion.  The patient is not nervous/anxious.          History:  Past Medical History:   Diagnosis Date    Arrhythmia     Chest pain     left sided - radiating down left arm     Fissure, anal 2009    GI (gastrointestinal bleed)     Migraine headache     Rectal bleeding 2009    Not constant, only when i have trouble with constipation    Seasonal allergies     with allergy shots     Priscilla-Parkinson-White syndrome        Past Surgical History:   Procedure Laterality Date    CARDIAC ELECTROPHYSIOLOGY PROCEDURE N/A 03/31/2017    Procedure: EP/Ablation;  Surgeon: Antwon Tena MD;  " Location: [facility] INVASIVE LOCATION;  Service:     COLONOSCOPY  03/2023    TONSILLECTOMY  1980       Family History   Problem Relation Age of Onset    Other Mother 69        CABG    Cancer Mother         Breast    Other Father         CABG in his 60's    Heart disease Father     No Known Problems Sister        Social History     Tobacco Use    Smoking status: Never    Smokeless tobacco: Never   Vaping Use    Vaping status: Never Used   Substance Use Topics    Alcohol use: No    Drug use: No       Allergies:  No Known Allergies    Medications:    Current Outpatient Medications:     Ascorbic Acid (ALYSSA-C PO), Take 1 tablet by mouth Daily. Emergency C pack daily, Disp: , Rfl:     esomeprazole (nexIUM) 40 MG capsule, TAKE 1 CAPSULE BY MOUTH EVERY MORNING BEFORE BREAKFAST, Disp: 90 capsule, Rfl: 1    esomeprazole (nexIUM) 40 MG capsule, Take 1 capsule by mouth Daily., Disp: 30 capsule, Rfl: 6    fexofenadine (ALLEGRA) 180 MG tablet, Take 1 tablet by mouth Daily. Rotate with Zyrtec, Disp: , Rfl:     loratadine-pseudoephedrine (Claritin-D 12 Hour) 5-120 MG per 12 hr tablet, Take 1 tablet by mouth As Needed., Disp: , Rfl:     Misc Natural Products (FIBER 7 PO), Take 1 tablet by mouth Daily., Disp: , Rfl:     naproxen (NAPROSYN) 500 MG tablet, Take 1 tablet by mouth Every 12 (Twelve) Hours., Disp: , Rfl:     OBJECTIVE:    Vital Signs:   Vitals:    11/20/24 1526   BP: 140/84   Pulse: 91   Temp: 98.2 °F (36.8 °C)   SpO2: 97%   Weight: 95.7 kg (211 lb)   Height: 180.3 cm (70.98\")       Physical Exam:     General Appearance:    Alert, cooperative, in no acute distress   Head:    Normocephalic, without obvious abnormality, atraumatic   Eyes:            Lids and lashes normal, conjunctivae and sclerae normal, no   icterus, no pallor, corneas clear, PERRLA   Ears:    Ears appear intact with no abnormalities noted   Throat:   No oral lesions, no thrush, oral mucosa moist   Neck:   No adenopathy, supple, trachea midline, no " thyromegaly, no   carotid bruit, no JVD   Back:     No kyphosis present, no scoliosis present, no skin lesions,      erythema or scars, no tenderness to percussion or                   palpation,   range of motion normal   Lungs:     Clear to auscultation,respirations regular, even and                  unlabored    Heart:    Regular rhythm and normal rate, normal S1 and S2, no            murmur, no gallop, no rub, no click   Chest Wall:    No abnormalities observed   Abdomen:     Normal bowel sounds, no masses, no organomegaly, soft        non-tender, non-distended, no guarding,    Extremities:   Moves all extremities well, no edema, no cyanosis, no             redness   Pulses:   Pulses palpable and equal bilaterally   Skin:   No bleeding, bruising or rash   Lymph nodes:   No palpable adenopathy   Neurologic:   Cranial nerves 2 - 12 grossly intact, sensation intact, DTR       present and equal bilaterally       Results Review:   I reviewed the patient's new clinical results.  I reviewed the patient's new imaging results and agree with the interpretation.  I reviewed the patient's other test results and agree with the interpretation    Review of Systems was reviewed and confirmed as accurate as documented by the MA.    ASSESSMENT/PLAN:    1. Gastroesophageal reflux disease with esophagitis without hemorrhage    2. PUD (peptic ulcer disease)        I did have a detailed and extensive discussion with the patient in the office today.  He needs to have another upper endoscopy for documentation of ulcer healing, risk and benefits of operative versus nonoperative intervention have been discussed with the patient, he understands and agrees, and wishes to proceed.    Given his amount of reflux he would be a good candidate to undergo robotic assisted laparoscopic Nissen versus Toupee fundoplication.  I will discuss this with him further when I see him back in the office.    Electronically signed by Tra Magaña,  MD  11/20/24 09:32 EST

## 2024-11-26 ENCOUNTER — TELEPHONE (OUTPATIENT)
Dept: SURGERY | Facility: CLINIC | Age: 44
End: 2024-11-26
Payer: COMMERCIAL

## 2024-12-13 ENCOUNTER — OUTSIDE FACILITY SERVICE (OUTPATIENT)
Dept: SURGERY | Facility: CLINIC | Age: 44
End: 2024-12-13
Payer: COMMERCIAL

## 2024-12-17 NOTE — PROGRESS NOTES
Patient: Rj Rincon    YOB: 1980    Date: 12/19/2024    Primary Care Provider: Vel Ramirez DO    Chief Complaint   Patient presents with    Follow-up     EGD       SUBJECTIVE:     History of present illness:  I did see the patient in the office today as a followup for follow-up evaluation and treatment of chronic reflux esophagitis.  The patient has had a previous EGD with biopsies performed 12/13/2024 which showed esophagitis and gastritis.  Pathology report showed gastric mucosa with reactive changes.      He does have a longstanding history of reflux esophagitis and takes proton pump inhibitors, he continues to have a large amount of reflux especially at night or after heavy meals.  He has had a previous Schatzki's ring that required dilation.      The following portions of the patient's history were reviewed and updated as appropriate: allergies, current medications, past family history, past medical history, past social history, past surgical history and problem list.    Review of Systems   Constitutional:  Negative for chills, fever and unexpected weight change.   HENT:  Negative for trouble swallowing and voice change.    Eyes:  Negative for visual disturbance.   Respiratory:  Negative for apnea, cough, chest tightness, shortness of breath and wheezing.    Cardiovascular:  Negative for chest pain, palpitations and leg swelling.   Gastrointestinal:  Negative for abdominal distention, abdominal pain, anal bleeding, blood in stool, constipation, diarrhea, nausea, rectal pain and vomiting.   Endocrine: Negative for cold intolerance and heat intolerance.   Genitourinary:  Negative for difficulty urinating, dysuria, flank pain, scrotal swelling and testicular pain.   Musculoskeletal:  Negative for back pain, gait problem and joint swelling.   Skin:  Negative for color change, rash and wound.   Neurological:  Negative for dizziness, syncope, speech difficulty, weakness, numbness and  headaches.   Hematological:  Negative for adenopathy. Does not bruise/bleed easily.   Psychiatric/Behavioral:  Negative for confusion. The patient is not nervous/anxious.        History:  Past Medical History:   Diagnosis Date    Arrhythmia     Chest pain     left sided - radiating down left arm     Fissure, anal 2009    GI (gastrointestinal bleed)     Migraine headache     Rectal bleeding 2009    Not constant, only when i have trouble with constipation    Seasonal allergies     with allergy shots     Priscilla-Parkinson-White syndrome        Past Surgical History:   Procedure Laterality Date    CARDIAC ELECTROPHYSIOLOGY PROCEDURE N/A 03/31/2017    Procedure: EP/Ablation;  Surgeon: Antwon Tena MD;  Location: [facility] INVASIVE LOCATION;  Service:     COLONOSCOPY  03/2023    TONSILLECTOMY  1980       Family History   Problem Relation Age of Onset    Other Mother 69        CABG    Cancer Mother         Breast    Other Father         CABG in his 60's    Heart disease Father     No Known Problems Sister        Social History     Tobacco Use    Smoking status: Never    Smokeless tobacco: Never   Vaping Use    Vaping status: Never Used   Substance Use Topics    Alcohol use: No    Drug use: No       Allergies:  No Known Allergies    Medications:    Current Outpatient Medications:     Ascorbic Acid (ALYSSA-C PO), Take 1 tablet by mouth Daily. Emergency C pack daily, Disp: , Rfl:     esomeprazole (nexIUM) 40 MG capsule, TAKE 1 CAPSULE BY MOUTH EVERY MORNING BEFORE BREAKFAST, Disp: 90 capsule, Rfl: 1    esomeprazole (nexIUM) 40 MG capsule, Take 1 capsule by mouth Daily., Disp: 30 capsule, Rfl: 6    fexofenadine (ALLEGRA) 180 MG tablet, Take 1 tablet by mouth Daily. Rotate with Zyrtec, Disp: , Rfl:     loratadine-pseudoephedrine (Claritin-D 12 Hour) 5-120 MG per 12 hr tablet, Take 1 tablet by mouth As Needed., Disp: , Rfl:     Misc Natural Products (FIBER 7 PO), Take 1 tablet by mouth Daily., Disp: , Rfl:     naproxen  "(NAPROSYN) 500 MG tablet, Take 1 tablet by mouth Every 12 (Twelve) Hours., Disp: , Rfl:     OBJECTIVE:    Vital Signs:   Vitals:    12/19/24 1306   BP: 132/90   Pulse: 81   Temp: 98.4 °F (36.9 °C)   SpO2: 98%   Weight: 95.7 kg (211 lb)   Height: 180.3 cm (70.98\")       Physical Exam:     General Appearance:    Alert, cooperative, in no acute distress   Head:    Normocephalic, without obvious abnormality, atraumatic   Eyes:            Lids and lashes normal, conjunctivae and sclerae normal, no   icterus, no pallor, corneas clear, PERRLA   Ears:    Ears appear intact with no abnormalities noted   Throat:   No oral lesions, no thrush, oral mucosa moist   Neck:   No adenopathy, supple, trachea midline, no thyromegaly, no   carotid bruit, no JVD   Back:     No kyphosis present, no scoliosis present, no skin lesions,      erythema or scars, no tenderness to percussion or                   palpation,   range of motion normal   Lungs:     Clear to auscultation,respirations regular, even and                  unlabored    Heart:    Regular rhythm and normal rate, normal S1 and S2, no            murmur, no gallop, no rub, no click   Chest Wall:    No abnormalities observed   Abdomen:     Normal bowel sounds, no masses, no organomegaly, soft        non-tender, non-distended, no guarding,    Extremities:   Moves all extremities well, no edema, no cyanosis, no             redness   Pulses:   Pulses palpable and equal bilaterally   Skin:   No bleeding, bruising or rash   Lymph nodes:   No palpable adenopathy   Neurologic:   Cranial nerves 2 - 12 grossly intact, sensation intact, DTR       present and equal bilaterally       Results Review:     I reviewed the patient's new clinical results.  I reviewed the patient's new imaging results and agree with the interpretation.  I reviewed the patient's other test results and agree with the interpretation    Review of Systems was reviewed and confirmed as accurate as documented by the " MA.    ASSESSMENT/PLAN:    1. Gastroesophageal reflux disease with esophagitis without hemorrhage        I did have a detailed and extensive discussion with the patient in the hospital and they understand that they need to undergo laparoscopic Nissen versus Toupee funduloplication for treatment of chronic reflux esophagitis.  I had a detailed discussion with the patient about the full risks and benefits of operative versus nonoperative intervention were discussed with the patient including nonresolution of symptoms or worsening of symptoms without surgical intervention versus infection, bleeding, open Nissen versus Toupee funduloplication, possible esophageal injury, possible need for hiatel hernia repair, possible recurrent hernia, possible difficulting swallowing after surgical intervention, etc.    I also specifically told the patient that they need to be very careful about postoperative oral intake and they need to avoid meats and breads for several days postoperative.  I want the patient to stay on liquids for 2-3 days postoperative and then slowly advance to soft diet.    Electronically signed by Tra Magaña MD  12/19/24

## 2024-12-19 ENCOUNTER — OFFICE VISIT (OUTPATIENT)
Dept: SURGERY | Facility: CLINIC | Age: 44
End: 2024-12-19
Payer: COMMERCIAL

## 2024-12-19 VITALS
HEART RATE: 81 BPM | HEIGHT: 71 IN | OXYGEN SATURATION: 98 % | SYSTOLIC BLOOD PRESSURE: 132 MMHG | WEIGHT: 211 LBS | TEMPERATURE: 98.4 F | DIASTOLIC BLOOD PRESSURE: 90 MMHG | BODY MASS INDEX: 29.54 KG/M2

## 2024-12-19 DIAGNOSIS — K21.00 GASTROESOPHAGEAL REFLUX DISEASE WITH ESOPHAGITIS WITHOUT HEMORRHAGE: Primary | ICD-10-CM

## 2024-12-19 PROCEDURE — 99213 OFFICE O/P EST LOW 20 MIN: CPT | Performed by: SURGERY

## 2024-12-23 ENCOUNTER — TELEPHONE (OUTPATIENT)
Dept: SURGERY | Facility: CLINIC | Age: 44
End: 2024-12-23
Payer: COMMERCIAL

## 2024-12-23 NOTE — TELEPHONE ENCOUNTER
Patient rescheduled his lap nissen with Dr. Magaña to 03/31 @ [facility]. Post op appt rescheduled. Will schedule PAT

## 2025-01-20 RX ORDER — ESOMEPRAZOLE MAGNESIUM 40 MG/1
40 CAPSULE, DELAYED RELEASE ORAL DAILY
Qty: 30 CAPSULE | Refills: 6 | Status: SHIPPED | OUTPATIENT
Start: 2025-01-20 | End: 2026-01-20

## 2025-03-11 ENCOUNTER — TELEPHONE (OUTPATIENT)
Dept: INTERNAL MEDICINE | Facility: CLINIC | Age: 45
End: 2025-03-11
Payer: COMMERCIAL

## 2025-03-11 NOTE — TELEPHONE ENCOUNTER
"Caller: Rj Rincon \"RANJANA\"    Relationship: Self    Best call back number: [phone]     What orders are you requesting (i.e. lab or imaging): BLOOD WORK NEEDED FOR PHYSICAL     In what timeframe would the patient need to come in: BEFORE MAY 9TH     Where will you receive your lab/imaging services: IN LAB     Additional notes: PLEASE CALL AND ADVISE WHEN ORDERS ARE PLACED   "

## 2025-03-17 ENCOUNTER — PRE-ADMISSION TESTING (OUTPATIENT)
Dept: PREADMISSION TESTING | Facility: HOSPITAL | Age: 45
End: 2025-03-17
Payer: COMMERCIAL

## 2025-03-17 VITALS — BODY MASS INDEX: 27.63 KG/M2 | WEIGHT: 198 LBS

## 2025-03-17 LAB
ANION GAP SERPL CALCULATED.3IONS-SCNC: 10.9 MMOL/L (ref 5–15)
BUN SERPL-MCNC: 13 MG/DL (ref 6–20)
BUN/CREAT SERPL: 12 (ref 7–25)
CALCIUM SPEC-SCNC: 9.4 MG/DL (ref 8.6–10.5)
CHLORIDE SERPL-SCNC: 99 MMOL/L (ref 98–107)
CO2 SERPL-SCNC: 27.1 MMOL/L (ref 22–29)
CREAT SERPL-MCNC: 1.08 MG/DL (ref 0.76–1.27)
DEPRECATED RDW RBC AUTO: 38 FL (ref 37–54)
EGFRCR SERPLBLD CKD-EPI 2021: 85.7 ML/MIN/1.73
ERYTHROCYTE [DISTWIDTH] IN BLOOD BY AUTOMATED COUNT: 12.4 % (ref 12.3–15.4)
GLUCOSE SERPL-MCNC: 93 MG/DL (ref 65–99)
HCT VFR BLD AUTO: 43 % (ref 37.5–51)
HGB BLD-MCNC: 14.8 G/DL (ref 13–17.7)
MCH RBC QN AUTO: 29.3 PG (ref 26.6–33)
MCHC RBC AUTO-ENTMCNC: 34.4 G/DL (ref 31.5–35.7)
MCV RBC AUTO: 85.1 FL (ref 79–97)
PLATELET # BLD AUTO: 238 10*3/MM3 (ref 140–450)
PMV BLD AUTO: 10.4 FL (ref 6–12)
POTASSIUM SERPL-SCNC: 4.1 MMOL/L (ref 3.5–5.2)
RBC # BLD AUTO: 5.05 10*6/MM3 (ref 4.14–5.8)
SODIUM SERPL-SCNC: 137 MMOL/L (ref 136–145)
WBC NRBC COR # BLD AUTO: 7.81 10*3/MM3 (ref 3.4–10.8)

## 2025-03-17 PROCEDURE — 85027 COMPLETE CBC AUTOMATED: CPT

## 2025-03-17 PROCEDURE — 93005 ELECTROCARDIOGRAM TRACING: CPT

## 2025-03-17 PROCEDURE — 80048 BASIC METABOLIC PNL TOTAL CA: CPT

## 2025-03-17 PROCEDURE — 36415 COLL VENOUS BLD VENIPUNCTURE: CPT

## 2025-03-17 NOTE — DISCHARGE INSTRUCTIONS
Pre-Admission testing appointment completed today for patient's upcoming procedure on 3/31/25 at [facility].     PAT PASS reviewed with patient and they verbalize understanding of the following:     Do not eat or drink anything after midnight the night before procedure unless otherwise instructed by physician/surgeon's office, this includes no gum, candy, mints, tobacco products or e-cigarettes.  Do not shave the area to be operated on at least 48 hours prior to procedure.  Do not wear makeup, lotion, hair products, or nail polish.  Do not wear any jewelry and remove all piercings.  Do not wear any adhesive if you wear dentures.  Do not wear contacts; bring in glasses if needed.  Only take medications on the morning of procedure as instructed by PAT nurse per anesthesia guidelines or as instructed by physician's office.  If you are on any blood thinners reach out to the physician/surgeon's office for instructions on when/if they will need to be stopped prior to procedure.  Bring in picture ID and insurance card, advanced directive copies if applicable, CPAP/BIPAP/Inhalers if indicated morning of procedure, leave any other valuables at home.  Ensure you have arranged for someone to drive you home the day of your procedure and someone to care for you at home afterwards. It is recommended that you do not drive, drink alcohol, or make any major legal decisions for at least 24 hours after your procedure is complete.   Chlorhexidine sponge along with instruction/information sheet given to patient. Readybath wipes given in lieu of CHG if patient has CHG allergy. Instructed patient to date, time, and initial the verification sheet once skin prep has been completed, and to return to [facility] the day of the procedure.   ERAS instructions given to patient unless otherwise instructed per surgeon's orders.     Anesthesia FAQ tip sheet given to patient.  Instructions and information given to patient about parking,  hospital entrance, and registration location.

## 2025-03-18 NOTE — NURSING NOTE
"Placed call to Tc eTnorio CRNA regarding 45 y/o pt scheduled for lap. nissen fundoplication with Dr. Magaña. ECG ordered per protocol; pt with medical hx including GERD, hyperlipidemia, BMI 27, Priscilla-Parkinson-White syndrome diagnosed about 10 years ago. Pt reports annual visits to cardiologist with flecainide discontinued a few years ago. Unconfirmed ECG reads \"Normal sinus rhythm  Anterior infarct , age undetermined, Abnormal ECG.\"  Patient denies chest pain, shortness of breath, palpitations, dizziness.  METS >4.  Per Tc, pt may proceed with surgery; no additional orders received.   "

## 2025-03-21 LAB
QT INTERVAL: 394 MS
QTC INTERVAL: 425 MS

## 2025-03-31 ENCOUNTER — ANESTHESIA EVENT (OUTPATIENT)
Dept: PERIOP | Facility: HOSPITAL | Age: 45
End: 2025-03-31
Payer: COMMERCIAL

## 2025-03-31 ENCOUNTER — HOSPITAL ENCOUNTER (OUTPATIENT)
Facility: HOSPITAL | Age: 45
Setting detail: HOSPITAL OUTPATIENT SURGERY
Discharge: HOME | End: 2025-03-31
Attending: SURGERY | Admitting: SURGERY
Payer: COMMERCIAL

## 2025-03-31 ENCOUNTER — ANESTHESIA (OUTPATIENT)
Dept: PERIOP | Facility: HOSPITAL | Age: 45
End: 2025-03-31
Payer: COMMERCIAL

## 2025-03-31 VITALS
RESPIRATION RATE: 18 BRPM | DIASTOLIC BLOOD PRESSURE: 80 MMHG | HEART RATE: 96 BPM | OXYGEN SATURATION: 92 % | TEMPERATURE: 98.1 F | SYSTOLIC BLOOD PRESSURE: 128 MMHG

## 2025-03-31 DIAGNOSIS — K21.00 GASTROESOPHAGEAL REFLUX DISEASE WITH ESOPHAGITIS WITHOUT HEMORRHAGE: ICD-10-CM

## 2025-03-31 PROCEDURE — 25810000003 LACTATED RINGERS PER 1000 ML: Performed by: SURGERY

## 2025-03-31 PROCEDURE — 25010000002 PROPOFOL 10 MG/ML EMULSION: Performed by: NURSE ANESTHETIST, CERTIFIED REGISTERED

## 2025-03-31 PROCEDURE — 25010000002 SUGAMMADEX 200 MG/2ML SOLUTION: Performed by: NURSE ANESTHETIST, CERTIFIED REGISTERED

## 2025-03-31 PROCEDURE — 25010000002 LIDOCAINE (CARDIAC): Performed by: NURSE ANESTHETIST, CERTIFIED REGISTERED

## 2025-03-31 PROCEDURE — 25010000002 ONDANSETRON PER 1 MG: Performed by: NURSE ANESTHETIST, CERTIFIED REGISTERED

## 2025-03-31 PROCEDURE — 25010000002 DEXAMETHASONE PER 1 MG: Performed by: NURSE ANESTHETIST, CERTIFIED REGISTERED

## 2025-03-31 PROCEDURE — 43280 LAPAROSCOPY FUNDOPLASTY: CPT | Performed by: SURGERY

## 2025-03-31 PROCEDURE — 99024 POSTOP FOLLOW-UP VISIT: CPT | Performed by: SURGERY

## 2025-03-31 PROCEDURE — 25010000002 CEFAZOLIN PER 500 MG: Performed by: SURGERY

## 2025-03-31 PROCEDURE — 25010000002 BUPIVACAINE (PF) 0.5 % SOLUTION: Performed by: SURGERY

## 2025-03-31 PROCEDURE — 25010000002 HYDROMORPHONE 1 MG/ML SOLUTION: Performed by: NURSE ANESTHETIST, CERTIFIED REGISTERED

## 2025-03-31 PROCEDURE — 25010000002 KETOROLAC TROMETHAMINE PER 15 MG: Performed by: NURSE ANESTHETIST, CERTIFIED REGISTERED

## 2025-03-31 PROCEDURE — 25010000002 HYDROMORPHONE PER 4 MG: Performed by: NURSE ANESTHETIST, CERTIFIED REGISTERED

## 2025-03-31 DEVICE — ABSORBABLE WOUND CLOSURE DEVICE
Type: IMPLANTABLE DEVICE | Site: ABDOMEN | Status: FUNCTIONAL
Brand: SYNETURE

## 2025-03-31 RX ORDER — BUPIVACAINE HYDROCHLORIDE 5 MG/ML
INJECTION, SOLUTION EPIDURAL; INTRACAUDAL; PERINEURAL AS NEEDED
Status: DISCONTINUED | OUTPATIENT
Start: 2025-03-31 | End: 2025-03-31 | Stop reason: HOSPADM

## 2025-03-31 RX ORDER — NAPROXEN SODIUM 220 MG/1
440 TABLET, FILM COATED ORAL 2 TIMES DAILY PRN
COMMUNITY

## 2025-03-31 RX ORDER — ONDANSETRON 2 MG/ML
INJECTION INTRAMUSCULAR; INTRAVENOUS AS NEEDED
Status: DISCONTINUED | OUTPATIENT
Start: 2025-03-31 | End: 2025-03-31 | Stop reason: SURG

## 2025-03-31 RX ORDER — HYDROCODONE BITARTRATE AND ACETAMINOPHEN 7.5; 325 MG/1; MG/1
1 TABLET ORAL EVERY 6 HOURS PRN
Qty: 15 TABLET | Refills: 0 | Status: SHIPPED | OUTPATIENT
Start: 2025-03-31

## 2025-03-31 RX ORDER — KETOROLAC TROMETHAMINE 30 MG/ML
INJECTION, SOLUTION INTRAMUSCULAR; INTRAVENOUS AS NEEDED
Status: DISCONTINUED | OUTPATIENT
Start: 2025-03-31 | End: 2025-03-31 | Stop reason: SURG

## 2025-03-31 RX ORDER — LORAZEPAM 2 MG/ML
1 INJECTION INTRAMUSCULAR ONCE AS NEEDED
Status: DISCONTINUED | OUTPATIENT
Start: 2025-03-31 | End: 2025-03-31 | Stop reason: HOSPADM

## 2025-03-31 RX ORDER — ONDANSETRON 2 MG/ML
4 INJECTION INTRAMUSCULAR; INTRAVENOUS ONCE AS NEEDED
Status: COMPLETED | OUTPATIENT
Start: 2025-03-31 | End: 2025-03-31

## 2025-03-31 RX ORDER — HYDROMORPHONE HYDROCHLORIDE 2 MG/ML
INJECTION, SOLUTION INTRAMUSCULAR; INTRAVENOUS; SUBCUTANEOUS AS NEEDED
Status: DISCONTINUED | OUTPATIENT
Start: 2025-03-31 | End: 2025-03-31 | Stop reason: SURG

## 2025-03-31 RX ORDER — SODIUM CHLORIDE, SODIUM LACTATE, POTASSIUM CHLORIDE, CALCIUM CHLORIDE 600; 310; 30; 20 MG/100ML; MG/100ML; MG/100ML; MG/100ML
50 INJECTION, SOLUTION INTRAVENOUS CONTINUOUS
Status: ACTIVE | OUTPATIENT
Start: 2025-03-31 | End: 2025-03-31

## 2025-03-31 RX ORDER — MORPHINE SULFATE 2 MG/ML
2 INJECTION, SOLUTION INTRAMUSCULAR; INTRAVENOUS
Status: DISCONTINUED | OUTPATIENT
Start: 2025-03-31 | End: 2025-03-31 | Stop reason: HOSPADM

## 2025-03-31 RX ORDER — ROCURONIUM BROMIDE 50 MG/5 ML
SYRINGE (ML) INTRAVENOUS AS NEEDED
Status: DISCONTINUED | OUTPATIENT
Start: 2025-03-31 | End: 2025-03-31 | Stop reason: SURG

## 2025-03-31 RX ORDER — PROPOFOL 10 MG/ML
VIAL (ML) INTRAVENOUS AS NEEDED
Status: DISCONTINUED | OUTPATIENT
Start: 2025-03-31 | End: 2025-03-31 | Stop reason: SURG

## 2025-03-31 RX ORDER — DEXAMETHASONE SODIUM PHOSPHATE 4 MG/ML
INJECTION, SOLUTION INTRA-ARTICULAR; INTRALESIONAL; INTRAMUSCULAR; INTRAVENOUS; SOFT TISSUE AS NEEDED
Status: DISCONTINUED | OUTPATIENT
Start: 2025-03-31 | End: 2025-03-31 | Stop reason: SURG

## 2025-03-31 RX ORDER — SUCCINYLCHOLINE/SOD CL,ISO/PF 200MG/10ML
SYRINGE (ML) INTRAVENOUS AS NEEDED
Status: DISCONTINUED | OUTPATIENT
Start: 2025-03-31 | End: 2025-03-31 | Stop reason: SURG

## 2025-03-31 RX ORDER — MEPERIDINE HYDROCHLORIDE 25 MG/ML
25 INJECTION INTRAMUSCULAR; INTRAVENOUS; SUBCUTANEOUS ONCE AS NEEDED
Status: DISCONTINUED | OUTPATIENT
Start: 2025-03-31 | End: 2025-03-31 | Stop reason: HOSPADM

## 2025-03-31 RX ADMIN — SUGAMMADEX 200 MG: 100 INJECTION, SOLUTION INTRAVENOUS at 14:45

## 2025-03-31 RX ADMIN — HYDROMORPHONE HYDROCHLORIDE 1 MG: 2 INJECTION, SOLUTION INTRAMUSCULAR; INTRAVENOUS; SUBCUTANEOUS at 13:08

## 2025-03-31 RX ADMIN — ONDANSETRON 4 MG: 2 INJECTION INTRAMUSCULAR; INTRAVENOUS at 18:45

## 2025-03-31 RX ADMIN — Medication 50 MG: at 12:56

## 2025-03-31 RX ADMIN — ONDANSETRON 4 MG: 2 INJECTION INTRAMUSCULAR; INTRAVENOUS at 12:56

## 2025-03-31 RX ADMIN — SODIUM CHLORIDE, POTASSIUM CHLORIDE, SODIUM LACTATE AND CALCIUM CHLORIDE 50 ML/HR: 600; 310; 30; 20 INJECTION, SOLUTION INTRAVENOUS at 11:32

## 2025-03-31 RX ADMIN — DEXAMETHASONE SODIUM PHOSPHATE 4 MG: 4 INJECTION, SOLUTION INTRA-ARTICULAR; INTRALESIONAL; INTRAMUSCULAR; INTRAVENOUS; SOFT TISSUE at 12:56

## 2025-03-31 RX ADMIN — HYDROMORPHONE HYDROCHLORIDE 1 MG: 2 INJECTION, SOLUTION INTRAMUSCULAR; INTRAVENOUS; SUBCUTANEOUS at 14:08

## 2025-03-31 RX ADMIN — HYDROMORPHONE HYDROCHLORIDE 0.5 MG: 0.5 INJECTION, SOLUTION INTRAMUSCULAR; INTRAVENOUS; SUBCUTANEOUS at 15:04

## 2025-03-31 RX ADMIN — LIDOCAINE HYDROCHLORIDE 60 MG: 20 INJECTION, SOLUTION INTRAVENOUS at 12:56

## 2025-03-31 RX ADMIN — PROPOFOL 200 MG: 10 INJECTION, EMULSION INTRAVENOUS at 12:56

## 2025-03-31 RX ADMIN — Medication 50 MG: at 14:10

## 2025-03-31 RX ADMIN — SODIUM CHLORIDE 2000 MG: 9 INJECTION, SOLUTION INTRAVENOUS at 12:56

## 2025-03-31 RX ADMIN — HYDROMORPHONE HYDROCHLORIDE 0.5 MG: 0.5 INJECTION, SOLUTION INTRAMUSCULAR; INTRAVENOUS; SUBCUTANEOUS at 15:23

## 2025-03-31 RX ADMIN — KETOROLAC TROMETHAMINE 15 MG: 30 INJECTION, SOLUTION INTRAMUSCULAR at 14:08

## 2025-03-31 RX ADMIN — Medication 100 MG: at 12:56

## 2025-03-31 RX ADMIN — SODIUM CHLORIDE, POTASSIUM CHLORIDE, SODIUM LACTATE AND CALCIUM CHLORIDE: 600; 310; 30; 20 INJECTION, SOLUTION INTRAVENOUS at 14:19

## 2025-03-31 NOTE — ANESTHESIA PREPROCEDURE EVALUATION
Anesthesia Evaluation     Patient summary reviewed and Nursing notes reviewed   NPO Solid Status: > 8 hours  NPO Liquid Status: > 8 hours           Airway   Mallampati: II  TM distance: >3 FB  Neck ROM: full  Possible difficult intubation  Dental      Pulmonary    Cardiovascular     ECG reviewed    (+) dysrhythmias, hyperlipidemia      Neuro/Psych  (+) headaches  GI/Hepatic/Renal/Endo    (+) GERD, GI bleeding     Musculoskeletal     Abdominal    Substance History      OB/GYN          Other        ROS/Med Hx Other: Chest pain  Gastroesophageal reflux disease with esophagitis without hemorrhage  Migraine headache  Mixed hyperlipidemia  Seasonal allergies  Priscilla-Parkinson-White syndrome                  Anesthesia Plan    ASA 3     general     intravenous induction     Anesthetic plan, risks, benefits, and alternatives have been provided, discussed and informed consent has been obtained with: patient.  Pre-procedure education provided  Plan discussed with CRNA.    CODE STATUS:

## 2025-03-31 NOTE — ANESTHESIA POSTPROCEDURE EVALUATION
Patient: Rj Rincon    Procedure Summary       Date: 03/31/25 Room / Location:  [facility] /  [facility]    Anesthesia Start: 1251 Anesthesia Stop: 1500    Procedure: NISSEN FUNDOPLICATION LAPAROSCOPIC WITH DAVINCI ROBOT (Abdomen) Diagnosis:       Gastroesophageal reflux disease with esophagitis without hemorrhage      (Gastroesophageal reflux disease with esophagitis without hemorrhage [K21.00])    Surgeons: Tra Magaña MD Provider: Twin Singer CRNA    Anesthesia Type: general ASA Status: 3            Anesthesia Type: general    Vitals  Vitals Value Taken Time   BP     Temp     Pulse 92 03/31/25 15:01   Resp     SpO2 100 % 03/31/25 15:01   Vitals shown include unfiled device data.        Post Anesthesia Care and Evaluation    Patient location during evaluation: PACU  Patient participation: complete - patient participated  Level of consciousness: awake  Pain score: 3  Pain management: adequate    Airway patency: patent  Anesthetic complications: No anesthetic complications  PONV Status: controlled  Cardiovascular status: acceptable and stable  Respiratory status: acceptable and face mask  Hydration status: acceptable    Comments: SEE NURSING NOTES FOR POST OP VITAL SIGNS

## 2025-03-31 NOTE — ANESTHESIA PROCEDURE NOTES
Airway  Reason: elective    Date/Time: 3/31/2025 12:56 PM  Airway not difficult    General Information and Staff    Patient location during procedure: OR  CRNA/CAA: Twin Singer CRNA    Indications and Patient Condition  Indications for airway management: airway protection    Preoxygenated: yes    Mask difficulty assessment: 1 - vent by mask    Final Airway Details    Final airway type: endotracheal airway      Successful airway: ETT  Cuffed: yes   Successful intubation technique: direct laryngoscopy  Endotracheal tube insertion site: oral  Blade: Rosmery  Blade size: 3  ETT size (mm): 7.5  Cormack-Lehane Classification: grade I - full view of glottis  Placement verified by: chest auscultation and capnometry   Measured from: lips  ETT/EBT  to lips (cm): 21  Number of attempts at approach: 1  Assessment: lips, teeth, and gum same as pre-op and atraumatic intubation

## 2025-03-31 NOTE — PROGRESS NOTES
Patient: Rj Rincon    YOB: 1980    Date: 12/19/2024    Primary Care Provider: Vel Ramirez DO    No chief complaint on file.      SUBJECTIVE:     History of present illness:  I did see the patient in the office today as a followup for follow-up evaluation and treatment of chronic reflux esophagitis.  The patient has had a previous EGD with biopsies performed 12/13/2024 which showed esophagitis and gastritis.  Pathology report showed gastric mucosa with reactive changes.      He does have a longstanding history of reflux esophagitis and takes proton pump inhibitors, he continues to have a large amount of reflux especially at night or after heavy meals.  He has had a previous Schatzki's ring that required dilation.      The following portions of the patient's history were reviewed and updated as appropriate: allergies, current medications, past family history, past medical history, past social history, past surgical history and problem list.    Review of Systems   Constitutional:  Negative for chills, fever and unexpected weight change.   HENT:  Negative for trouble swallowing and voice change.    Eyes:  Negative for visual disturbance.   Respiratory:  Negative for apnea, cough, chest tightness, shortness of breath and wheezing.    Cardiovascular:  Negative for chest pain, palpitations and leg swelling.   Gastrointestinal:  Negative for abdominal distention, abdominal pain, anal bleeding, blood in stool, constipation, diarrhea, nausea, rectal pain and vomiting.   Endocrine: Negative for cold intolerance and heat intolerance.   Genitourinary:  Negative for difficulty urinating, dysuria, flank pain, scrotal swelling and testicular pain.   Musculoskeletal:  Negative for back pain, gait problem and joint swelling.   Skin:  Negative for color change, rash and wound.   Neurological:  Negative for dizziness, syncope, speech difficulty, weakness, numbness and headaches.   Hematological:  Negative for  adenopathy. Does not bruise/bleed easily.   Psychiatric/Behavioral:  Negative for confusion. The patient is not nervous/anxious.        History:  Past Medical History:   Diagnosis Date    Chest pain     history of - denies any currently - 3/17/25    Fissure, anal 2009    GERD (gastroesophageal reflux disease)     GI (gastrointestinal bleed)     Hyperlipidemia     Migraine headache     Rectal bleeding 2009    Not constant, only when i have trouble with constipation    Reflux esophagitis     Seasonal allergies     with allergy shots     Priscilla-Parkinson-White syndrome     dx 2012; LOV Dr. Allen 5/9/24- (Flecainide discontinued approx 2021); pt denies chest pain, palpitations, SOB       Past Surgical History:   Procedure Laterality Date    CARDIAC ELECTROPHYSIOLOGY PROCEDURE N/A 03/31/2017    Procedure: EP/Ablation;  Surgeon: Antwon Tena MD;  Location: [facility] INVASIVE LOCATION;  Service:     COLONOSCOPY  03/2023    ENDOSCOPY      TONSILLECTOMY  1980    and adenoids    VASECTOMY  2024       Family History   Problem Relation Age of Onset    Other Mother 69        CABG    Cancer Mother         Breast    Other Father         CABG in his 60's    Heart disease Father     No Known Problems Sister        Social History     Tobacco Use    Smoking status: Never    Smokeless tobacco: Never   Vaping Use    Vaping status: Never Used   Substance Use Topics    Alcohol use: No    Drug use: No       Allergies:  No Known Allergies    Medications:    Current Facility-Administered Medications:     ceFAZolin 2000 mg IVPB in 100 mL NS (VTB), 2,000 mg, Intravenous, Once, Tra Magaña MD    lactated ringers infusion, 50 mL/hr, Intravenous, Continuous, Tra Magaña MD, Last Rate: 50 mL/hr at 03/31/25 1132, 50 mL/hr at 03/31/25 1132    OBJECTIVE:    Vital Signs:   Vitals:    03/31/25 1101   BP: 134/86   BP Location: Right arm   Patient Position: Sitting   Pulse: 75   Resp: 18   Temp: 96.8 °F (36 °C)   TempSrc: Temporal   SpO2:  98%       Physical Exam:     General Appearance:    Alert, cooperative, in no acute distress   Head:    Normocephalic, without obvious abnormality, atraumatic   Eyes:            Lids and lashes normal, conjunctivae and sclerae normal, no   icterus, no pallor, corneas clear, PERRLA   Ears:    Ears appear intact with no abnormalities noted   Throat:   No oral lesions, no thrush, oral mucosa moist   Neck:   No adenopathy, supple, trachea midline, no thyromegaly, no   carotid bruit, no JVD   Back:     No kyphosis present, no scoliosis present, no skin lesions,      erythema or scars, no tenderness to percussion or                   palpation,   range of motion normal   Lungs:     Clear to auscultation,respirations regular, even and                  unlabored    Heart:    Regular rhythm and normal rate, normal S1 and S2, no            murmur, no gallop, no rub, no click   Chest Wall:    No abnormalities observed   Abdomen:     Normal bowel sounds, no masses, no organomegaly, soft        non-tender, non-distended, no guarding,    Extremities:   Moves all extremities well, no edema, no cyanosis, no             redness   Pulses:   Pulses palpable and equal bilaterally   Skin:   No bleeding, bruising or rash   Lymph nodes:   No palpable adenopathy   Neurologic:   Cranial nerves 2 - 12 grossly intact, sensation intact, DTR       present and equal bilaterally       Results Review:     I reviewed the patient's new clinical results.  I reviewed the patient's new imaging results and agree with the interpretation.  I reviewed the patient's other test results and agree with the interpretation    Review of Systems was reviewed and confirmed as accurate as documented by the MA.    ASSESSMENT/PLAN:    1. Gastroesophageal reflux disease with esophagitis without hemorrhage        I did have a detailed and extensive discussion with the patient in the hospital and they understand that they need to undergo laparoscopic Nissen versus Toupee  funduloplication for treatment of chronic reflux esophagitis.  I had a detailed discussion with the patient about the full risks and benefits of operative versus nonoperative intervention were discussed with the patient including nonresolution of symptoms or worsening of symptoms without surgical intervention versus infection, bleeding, open Nissen versus Toupee funduloplication, possible esophageal injury, possible need for hiatel hernia repair, possible recurrent hernia, possible difficulting swallowing after surgical intervention, etc.    I also specifically told the patient that they need to be very careful about postoperative oral intake and they need to avoid meats and breads for several days postoperative.  I want the patient to stay on liquids for 2-3 days postoperative and then slowly advance to soft diet.    Electronically signed by Tra Magaña MD  03/31/25

## 2025-03-31 NOTE — H&P (VIEW-ONLY)
Patient: Rj Rincon    YOB: 1980    Date: 12/19/2024    Primary Care Provider: Vel Ramirez DO    No chief complaint on file.      SUBJECTIVE:     History of present illness:  I did see the patient in the office today as a followup for follow-up evaluation and treatment of chronic reflux esophagitis.  The patient has had a previous EGD with biopsies performed 12/13/2024 which showed esophagitis and gastritis.  Pathology report showed gastric mucosa with reactive changes.      He does have a longstanding history of reflux esophagitis and takes proton pump inhibitors, he continues to have a large amount of reflux especially at night or after heavy meals.  He has had a previous Schatzki's ring that required dilation.      The following portions of the patient's history were reviewed and updated as appropriate: allergies, current medications, past family history, past medical history, past social history, past surgical history and problem list.    Review of Systems   Constitutional:  Negative for chills, fever and unexpected weight change.   HENT:  Negative for trouble swallowing and voice change.    Eyes:  Negative for visual disturbance.   Respiratory:  Negative for apnea, cough, chest tightness, shortness of breath and wheezing.    Cardiovascular:  Negative for chest pain, palpitations and leg swelling.   Gastrointestinal:  Negative for abdominal distention, abdominal pain, anal bleeding, blood in stool, constipation, diarrhea, nausea, rectal pain and vomiting.   Endocrine: Negative for cold intolerance and heat intolerance.   Genitourinary:  Negative for difficulty urinating, dysuria, flank pain, scrotal swelling and testicular pain.   Musculoskeletal:  Negative for back pain, gait problem and joint swelling.   Skin:  Negative for color change, rash and wound.   Neurological:  Negative for dizziness, syncope, speech difficulty, weakness, numbness and headaches.   Hematological:  Negative for  adenopathy. Does not bruise/bleed easily.   Psychiatric/Behavioral:  Negative for confusion. The patient is not nervous/anxious.        History:  Past Medical History:   Diagnosis Date    Chest pain     history of - denies any currently - 3/17/25    Fissure, anal 2009    GERD (gastroesophageal reflux disease)     GI (gastrointestinal bleed)     Hyperlipidemia     Migraine headache     Rectal bleeding 2009    Not constant, only when i have trouble with constipation    Reflux esophagitis     Seasonal allergies     with allergy shots     Priscilla-Parkinson-White syndrome     dx 2012; LOV Dr. Allen 5/9/24- (Flecainide discontinued approx 2021); pt denies chest pain, palpitations, SOB       Past Surgical History:   Procedure Laterality Date    CARDIAC ELECTROPHYSIOLOGY PROCEDURE N/A 03/31/2017    Procedure: EP/Ablation;  Surgeon: Antwon Tena MD;  Location: [facility] INVASIVE LOCATION;  Service:     COLONOSCOPY  03/2023    ENDOSCOPY      TONSILLECTOMY  1980    and adenoids    VASECTOMY  2024       Family History   Problem Relation Age of Onset    Other Mother 69        CABG    Cancer Mother         Breast    Other Father         CABG in his 60's    Heart disease Father     No Known Problems Sister        Social History     Tobacco Use    Smoking status: Never    Smokeless tobacco: Never   Vaping Use    Vaping status: Never Used   Substance Use Topics    Alcohol use: No    Drug use: No       Allergies:  No Known Allergies    Medications:    Current Facility-Administered Medications:     ceFAZolin 2000 mg IVPB in 100 mL NS (VTB), 2,000 mg, Intravenous, Once, Tra Magaña MD    lactated ringers infusion, 50 mL/hr, Intravenous, Continuous, Tra Magaña MD, Last Rate: 50 mL/hr at 03/31/25 1132, 50 mL/hr at 03/31/25 1132    OBJECTIVE:    Vital Signs:   Vitals:    03/31/25 1101   BP: 134/86   BP Location: Right arm   Patient Position: Sitting   Pulse: 75   Resp: 18   Temp: 96.8 °F (36 °C)   TempSrc: Temporal   SpO2:  98%       Physical Exam:     General Appearance:    Alert, cooperative, in no acute distress   Head:    Normocephalic, without obvious abnormality, atraumatic   Eyes:            Lids and lashes normal, conjunctivae and sclerae normal, no   icterus, no pallor, corneas clear, PERRLA   Ears:    Ears appear intact with no abnormalities noted   Throat:   No oral lesions, no thrush, oral mucosa moist   Neck:   No adenopathy, supple, trachea midline, no thyromegaly, no   carotid bruit, no JVD   Back:     No kyphosis present, no scoliosis present, no skin lesions,      erythema or scars, no tenderness to percussion or                   palpation,   range of motion normal   Lungs:     Clear to auscultation,respirations regular, even and                  unlabored    Heart:    Regular rhythm and normal rate, normal S1 and S2, no            murmur, no gallop, no rub, no click   Chest Wall:    No abnormalities observed   Abdomen:     Normal bowel sounds, no masses, no organomegaly, soft        non-tender, non-distended, no guarding,    Extremities:   Moves all extremities well, no edema, no cyanosis, no             redness   Pulses:   Pulses palpable and equal bilaterally   Skin:   No bleeding, bruising or rash   Lymph nodes:   No palpable adenopathy   Neurologic:   Cranial nerves 2 - 12 grossly intact, sensation intact, DTR       present and equal bilaterally       Results Review:     I reviewed the patient's new clinical results.  I reviewed the patient's new imaging results and agree with the interpretation.  I reviewed the patient's other test results and agree with the interpretation    Review of Systems was reviewed and confirmed as accurate as documented by the MA.    ASSESSMENT/PLAN:    1. Gastroesophageal reflux disease with esophagitis without hemorrhage        I did have a detailed and extensive discussion with the patient in the hospital and they understand that they need to undergo laparoscopic Nissen versus Toupee  funduloplication for treatment of chronic reflux esophagitis.  I had a detailed discussion with the patient about the full risks and benefits of operative versus nonoperative intervention were discussed with the patient including nonresolution of symptoms or worsening of symptoms without surgical intervention versus infection, bleeding, open Nissen versus Toupee funduloplication, possible esophageal injury, possible need for hiatel hernia repair, possible recurrent hernia, possible difficulting swallowing after surgical intervention, etc.    I also specifically told the patient that they need to be very careful about postoperative oral intake and they need to avoid meats and breads for several days postoperative.  I want the patient to stay on liquids for 2-3 days postoperative and then slowly advance to soft diet.    Electronically signed by Tra Magaña MD  03/31/25

## 2025-03-31 NOTE — OP NOTE
PATIENT:    Rj Rincon    DATE OF SURGERY:  3/31/2025     PHYSICIAN:    Tra Magaña MD    REFERRING PHYSICIAN:   Vel Ramirez DO    YOB: 1980     PREOPERATIVE DIAGNOSIS:  Chronic reflux esophagitis    POSTOPERATIVE DIAGNOSIS:  Chronic reflux esophagitis    PROCEDURE:  Robotic Assisted Laparoscopic Toupet Fundoplication    EBL:  Less than 50 cc    COMPLICATIONS:  None    ANESTHESIA:  General Anesthesia    OPERATIVE PROCEDURE:      The patient was taken to the operating room, placed in the supine position, and given general endotracheal anesthesia.  They were prepped and draped in the normal sterile fashion. They did receive preoperative IV antibiotics.  The nursing staff did perform intraoperative timeout prior to the incision.  Anesthesia placed a NGT.    A supraumbilical incision was made in order to insert a Veress needle for insufflation of CO2 in order to obtain pneumoperitoneum.  We then inserted four 8 mm trocars 1 on the right side of the umbilicus, 1 at the umbilicus, and 2 on the left side of the umbilicus.  These were done under direct vision, a liver retractor was inserted through a small epigastric incision to retract the left lobe of the liver.    Excellent visualization was obtained, we were able to docked the robot without difficulty, and then we were able to proceed with the procedure.  We carefully divided the peritoneum over the caudate lobe of the liver and quickly extended this up to the right salty of the diaphragm.  I was able to divide the sac itself over the anterior surface of the esophagus.  Being very careful to avoid the esophagus itself.    I then proceeded to get into the lesser sac on the greater curvature of the stomach with the vessel sealer device divided the short gastrics themselves and continued this up to the left salty of the diaphragm which was identified.  We were then able to create a window posterior to the esophagus, previous NG tube was placed and  suction was placed on the stomach.  This was done without difficulty the left salty of the diaphragm was identified.    I was easily able to bring the fundus of the stomach through my window and I had approximately 4-5 cm of esophagus distal to the diaphragmatic hiatus, the sac in the hiatal hernia had been removed.  I had to divide the sac and peritoneal structures around the anterior surface of the stomach in order to obtain better exposure, this was eventually removed.    I was able to repair the crura of the diaphragm with a running 2-0 VLock suture without difficulty, then a Toupet 270 degree wrap was then performed using 2-0 Ethibond suture with 3 throws on the left and the right side respectively.   I then used a 2-0 Ethibond suture to perform gastropexy of the stomach wrap to the right salty of the diaphragm.    All needle counts were correct at this time, instruments were removed under direct vision, hemostasis was intact.  The liver retractor was removed under direct vision and the robot was undocked.  All trocars were removed and 4-0 Vicryl subcuticular stitch was used for skin reapproximation along with Steri-Strips patient was stable at this time.      The patient was stable at this point in time and subsequently transferred back to the recovery room in stable condition.     Tra Magaña MD  3/31/2025  14:54 EDT

## 2025-04-08 ENCOUNTER — TELEPHONE (OUTPATIENT)
Dept: SURGERY | Facility: CLINIC | Age: 45
End: 2025-04-08
Payer: COMMERCIAL

## 2025-04-09 NOTE — PROGRESS NOTES
"Patient: Rj Rincon    YOB: 1980    Date: 04/14/2025    Primary Care Provider: Vel Ramirez DO    Chief Complaint   Patient presents with    Post-op Follow-up     Lap nissen        History of present illness:  I saw the patient in the office today as a followup from their recent robotic assisted laparoscopic toupet fundoplication which was performed 03/31/2025.  They state that they have done well and are having no complaints.  His reflux has been fairly well resolved and he does not have the reflux type symptoms that he had preop.        Vital Signs:   Vitals:    04/14/25 0835   BP: 140/72   Pulse: 112   Temp: 98 °F (36.7 °C)   TempSrc: Infrared   SpO2: 99%   Weight: 85.5 kg (188 lb 9.6 oz)   Height: 180.3 cm (70.98\")     Physical Exam:     General : no acute distress  Chest : clear bilaterally  COR : regular rate and rhythm  ABD :  soft nontender, all incisions healed nicely      Assessment / Plan:    1. Post-operative state        I did discuss the situation with the patient today in the office and they have done well from their recent robotic assisted laparoscopic toupet fundoplication.  I have released the patient back to normal activity, they understand that they need to be careful about heavy lifting.  I need to see the patient back in the office only if they are having further problems, they know to call me if they are.    Electronically signed by Tra Magaña MD  04/16/25              Answers submitted by the patient for this visit:  Post Operative Visit (Submitted on 4/12/2025)  Chief Complaint: Follow-up  Pain Control: controlled  Fever: no fever  Diet: adequate intake  Activity: moderately limited  Operative Site Issues: No  Additional information: Have a lot of gas issues rumbling around inside stomach and chest area    "

## 2025-04-14 ENCOUNTER — OFFICE VISIT (OUTPATIENT)
Dept: SURGERY | Facility: CLINIC | Age: 45
End: 2025-04-14
Payer: COMMERCIAL

## 2025-04-14 VITALS
OXYGEN SATURATION: 99 % | HEIGHT: 71 IN | TEMPERATURE: 98 F | BODY MASS INDEX: 26.4 KG/M2 | WEIGHT: 188.6 LBS | HEART RATE: 112 BPM | DIASTOLIC BLOOD PRESSURE: 72 MMHG | SYSTOLIC BLOOD PRESSURE: 140 MMHG

## 2025-04-14 DIAGNOSIS — Z98.890 POST-OPERATIVE STATE: Primary | ICD-10-CM

## 2025-05-09 ENCOUNTER — OFFICE VISIT (OUTPATIENT)
Dept: INTERNAL MEDICINE | Facility: CLINIC | Age: 45
End: 2025-05-09
Payer: COMMERCIAL

## 2025-05-09 VITALS
RESPIRATION RATE: 18 BRPM | HEART RATE: 79 BPM | TEMPERATURE: 97.9 F | BODY MASS INDEX: 26.32 KG/M2 | SYSTOLIC BLOOD PRESSURE: 113 MMHG | WEIGHT: 188 LBS | OXYGEN SATURATION: 100 % | HEIGHT: 71 IN | DIASTOLIC BLOOD PRESSURE: 74 MMHG

## 2025-05-09 DIAGNOSIS — I45.6 WOLFF-PARKINSON-WHITE SYNDROME: ICD-10-CM

## 2025-05-09 DIAGNOSIS — E78.2 MIXED HYPERLIPIDEMIA: ICD-10-CM

## 2025-05-09 DIAGNOSIS — K21.9 GASTROESOPHAGEAL REFLUX DISEASE WITHOUT ESOPHAGITIS: ICD-10-CM

## 2025-05-09 DIAGNOSIS — Z00.00 ENCOUNTER FOR PREVENTIVE HEALTH EXAMINATION: Primary | ICD-10-CM

## 2025-05-09 PROCEDURE — 99396 PREV VISIT EST AGE 40-64: CPT | Performed by: INTERNAL MEDICINE

## 2025-05-09 NOTE — PROGRESS NOTES
Chief Complaint   Patient presents with    Annual Exam       Subjective     History of Present Illness  The patient is a 45-year-old male presenting for an annual exam.    He has been experiencing severe allergy symptoms for the past 1 to 2 weeks, necessitating a visit to a local clinic last Thursday where he received a steroid injection and a Z-Julian. Despite these interventions, his symptoms have not significantly improved. His work with the [employer] program often exposes him to outdoor environments, which may exacerbate his condition. He typically wears a mask when mowing the lawn. Prior to this recent flare-up, he had been managing well for approximately a year. He alternates between Claritin-D and Allegra every few months and also uses Flonase nasal spray. Severe flare-ups usually require a steroid injection, but this treatment was ineffective during his most recent episode. He has not consulted an allergist in several years and has not seen an ENT specialist recently. He reports no presence of rashes. He has previously undergone allergy testing and received allergy injections at home administered by his wife. He has not been diagnosed with nasal polyps. He underwent tonsillectomy in his childhood.    He has a history of hiatal hernia, for which he underwent surgery and has since discontinued medication. He reports no current reflux symptoms and maintains a healthy diet, although he avoids steak and pasta. He has lost 10 pounds and has abstained from soda consumption since Christmas. He is fasting today, having only consumed black coffee.    He has a history of Priscilla-Parkinson-White syndrome but reports no current issues. He is scheduled to see his cardiologist on 05/22/2025. His cardiologist monitors his cholesterol levels and conducted a calcium score test last year, which yielded normal results. He aims to consult his cardiologist at least once annually. EKGs are performed intermittently, with the most  recent one conducted prior to his surgery.    PAST SURGICAL HISTORY:  - Tonsillectomy in childhood  - Hiatal hernia surgery    FAMILY HISTORY  His grandparents, uncles, mother, and father all had open heart surgery. His mother is currently battling breast cancer and has had it twice, with the last occurrence resulting in a mastectomy.    The following portions of the patient's history were reviewed and updated as appropriate: allergies, current medications, past family history, past medical history, past social history, past surgical history and problem list.    Review of Systems   Constitutional:  Negative for chills, fatigue and fever.   HENT:  Negative for congestion, ear pain, rhinorrhea, sinus pressure and sore throat.    Eyes:  Negative for visual disturbance.   Respiratory:  Negative for cough, chest tightness, shortness of breath and wheezing.    Cardiovascular:  Negative for chest pain, palpitations and leg swelling.   Gastrointestinal:  Negative for abdominal pain, blood in stool, constipation, diarrhea, nausea and vomiting.   Endocrine: Negative for polydipsia and polyuria.   Genitourinary:  Negative for dysuria and hematuria.   Musculoskeletal:  Negative for arthralgias and back pain.   Skin:  Negative for rash.   Neurological:  Negative for dizziness, light-headedness, numbness and headaches.   Psychiatric/Behavioral:  Negative for dysphoric mood and sleep disturbance. The patient is not nervous/anxious.        No Known Allergies    Past Medical History:   Diagnosis Date    Allergic 1997    Chest pain     history of - denies any currently - 3/17/25    Fissure, anal 2009    GERD (gastroesophageal reflux disease)     GI (gastrointestinal bleed)     Hyperlipidemia     Migraine headache     Rectal bleeding 2009    Not constant, only when i have trouble with constipation    Reflux esophagitis     Seasonal allergies     with allergy shots     Priscilla-Parkinson-White syndrome     dx 2012; LOV Dr. Allen 5/9/24-  "(Flecainide discontinued approx 2021); pt denies chest pain, palpitations, SOB       Social History     Socioeconomic History    Marital status:    Tobacco Use    Smoking status: Never     Passive exposure: Never    Smokeless tobacco: Never   Vaping Use    Vaping status: Never Used   Substance and Sexual Activity    Alcohol use: No    Drug use: No    Sexual activity: Yes     Partners: Female     Birth control/protection: Vasectomy        Past Surgical History:   Procedure Laterality Date    ADENOIDECTOMY  1985    CARDIAC ELECTROPHYSIOLOGY PROCEDURE N/A 03/31/2017    Procedure: EP/Ablation;  Surgeon: Antwon Tena MD;  Location:  [facility];  Service:     COLONOSCOPY  03/2023    ENDOSCOPY      NISSEN FUNDOPLICATION N/A 03/31/2025    Procedure: NISSEN FUNDOPLICATION LAPAROSCOPIC WITH DAVINCI ROBOT;  Surgeon: Tra Magaña MD;  Location:  [facility];  Service: Robotics - DaVinci;  Laterality: N/A;    TONSILLECTOMY  1985    and adenoids    VASECTOMY  2024       Family History   Problem Relation Age of Onset    Other Mother 69        CABG    Cancer Mother         Breast    Other Father         CABG in his 60's    Heart disease Father     No Known Problems Sister          Current Outpatient Medications:     Ascorbic Acid (ALYSSA-C PO), Take 1 tablet by mouth Daily. Emergency C pack daily, Disp: , Rfl:     fexofenadine (ALLEGRA) 180 MG tablet, Take 1 tablet by mouth Daily. Rotate with Zyrtec, Disp: , Rfl:     loratadine-pseudoephedrine (Claritin-D 12 Hour) 5-120 MG per 12 hr tablet, Take 1 tablet by mouth As Needed., Disp: , Rfl:     Misc Natural Products (FIBER 7 PO), Take 1 tablet by mouth Daily., Disp: , Rfl:     naproxen sodium (ALEVE) 220 MG tablet, Take 2 tablets by mouth 2 (Two) Times a Day As Needed for Mild Pain., Disp: , Rfl:     Objective   /74   Pulse 79   Temp 97.9 °F (36.6 °C) (Temporal)   Resp 18   Ht 180.3 cm (71\")   Wt 85.3 kg (188 lb)   SpO2 100%   BMI 26.22 kg/m² "     Physical Exam  Vitals and nursing note reviewed.   Constitutional:       Appearance: Normal appearance. He is well-developed.   HENT:      Head: Normocephalic and atraumatic.      Right Ear: Tympanic membrane and external ear normal.      Left Ear: Tympanic membrane and external ear normal.      Nose: Nose normal. No congestion.      Mouth/Throat:      Mouth: Mucous membranes are moist.      Pharynx: No oropharyngeal exudate.   Eyes:      General: No scleral icterus.        Right eye: No discharge.      Extraocular Movements: Extraocular movements intact.      Conjunctiva/sclera: Conjunctivae normal.      Pupils: Pupils are equal, round, and reactive to light.   Neck:      Thyroid: No thyromegaly.      Vascular: No JVD.   Cardiovascular:      Rate and Rhythm: Normal rate and regular rhythm.      Heart sounds: Normal heart sounds. No murmur heard.     No friction rub.   Pulmonary:      Effort: Pulmonary effort is normal. No respiratory distress.      Breath sounds: Normal breath sounds. No stridor. No wheezing.   Abdominal:      General: Bowel sounds are normal. There is no distension.      Palpations: Abdomen is soft.      Tenderness: There is no abdominal tenderness. There is no guarding.   Genitourinary:     Comments: Deferred  Musculoskeletal:         General: No tenderness. Normal range of motion.      Cervical back: Normal range of motion and neck supple.   Lymphadenopathy:      Cervical: No cervical adenopathy.   Skin:     General: Skin is warm and dry.      Findings: No rash.   Neurological:      General: No focal deficit present.      Mental Status: He is alert and oriented to person, place, and time.      Cranial Nerves: No cranial nerve deficit.   Psychiatric:         Mood and Affect: Mood normal.         Behavior: Behavior normal.         Thought Content: Thought content normal.           Results for orders placed or performed in visit on 03/17/25   ECG 12 Lead    Collection Time: 03/17/25  3:51 PM    Result Value Ref Range    QT Interval 394 ms    QTC Interval 425 ms   Basic Metabolic Panel    Collection Time: 03/17/25  5:28 PM    Specimen: Blood   Result Value Ref Range    Glucose 93 65 - 99 mg/dL    BUN 13 6 - 20 mg/dL    Creatinine 1.08 0.76 - 1.27 mg/dL    Sodium 137 136 - 145 mmol/L    Potassium 4.1 3.5 - 5.2 mmol/L    Chloride 99 98 - 107 mmol/L    CO2 27.1 22.0 - 29.0 mmol/L    Calcium 9.4 8.6 - 10.5 mg/dL    BUN/Creatinine Ratio 12.0 7.0 - 25.0    Anion Gap 10.9 5.0 - 15.0 mmol/L    eGFR 85.7 >60.0 mL/min/1.73   CBC (No Diff)    Collection Time: 03/17/25  5:28 PM    Specimen: Blood   Result Value Ref Range    WBC 7.81 3.40 - 10.80 10*3/mm3    RBC 5.05 4.14 - 5.80 10*6/mm3    Hemoglobin 14.8 13.0 - 17.7 g/dL    Hematocrit 43.0 37.5 - 51.0 %    MCV 85.1 79.0 - 97.0 fL    MCH 29.3 26.6 - 33.0 pg    MCHC 34.4 31.5 - 35.7 g/dL    RDW 12.4 12.3 - 15.4 %    RDW-SD 38.0 37.0 - 54.0 fl    MPV 10.4 6.0 - 12.0 fL    Platelets 238 140 - 450 10*3/mm3       Assessment & Plan   Diagnoses and all orders for this visit:    1. Encounter for preventive health examination (Primary)  -     CBC & Differential  -     Comprehensive Metabolic Panel  -     Lipid Panel    2. Priscilla-Parkinson-White syndrome    3. Mixed hyperlipidemia  -     CBC & Differential  -     Comprehensive Metabolic Panel  -     Lipid Panel    4. Gastroesophageal reflux disease without esophagitis        Discussion Summary:  Patient is a 45 y.o. male presenting for annual physical    Preventive Health Maintenance  - Baseline labs are up-to-date or ordered per above.  - Vaccines reviewed and updated  - Preventive health measures were discussed including: healthy diet with increase in fruits and vegetables, regular exercise at least 3 times a week, safe sex practices, avoidance of drugs, tobacco, and alcohol, and regular seatbelt use.    Assessment & Plan  1. Allergic rhinitis.  - He reported a severe flare-up last week, requiring a steroid shot and a Z-Julian.  He continues to experience symptoms despite these treatments.  - Advised to consider wearing a mask during outdoor activities to reduce exposure to allergens. Suggested the use of ipratropium nasal spray as an alternative if Flonase proves ineffective.  - Discussed the option of seeing an allergist or ENT specialist for further evaluation and potential allergy injections.  - Referral to an allergist was deferred at this time. If symptoms persist or worsen, he may consider seeing an allergist or ENT specialist.    2. Hiatal hernia s/p Nissen fundoplication  - Underwent surgery for a hiatal hernia and has since discontinued medication for reflux. No reflux symptoms post-surgery.  - Advised to avoid foods that could get stuck, such as steak and pasta, but he has been able to tolerate some bread without issues.  - Encouraged to maintain his current diet and weight loss efforts.  - Monitoring for any potential recurrence of symptoms.    3. Priscilla-Parkinson-White syndrome.  - History of WPW but reports no current issues. Follows up with his cardiologist annually, with the next appointment scheduled for 05/22/2025.  - Previous calcium score test was excellent, and he undergoes EKGs periodically.  - Advised to continue routine checks and follow-up with the cardiologist.  - Monitoring for any potential recurrence of symptoms.    4. Health maintenance.  - Lost 10 pounds and is encouraged to lose another 10 pounds to reach a normal BMI.  - Routine lab work, including cholesterol and blood counts, will be conducted today.  - Monitoring weight and lab results to ensure overall health maintenance.  - Encouraged to continue healthy lifestyle changes, including diet and exercise.         Follow up:  Return in about 1 year (around 5/9/2026) for Next scheduled follow up, Annual physical.     Patient Instructions:  Patient instructions were provided.    Patient or patient representative verbalized consent for the use of Ambient  Listening during the visit with  Vel Ramirez DO for chart documentation. 5/9/2025  13:44 EDT

## 2025-05-10 LAB
ALBUMIN SERPL-MCNC: 4.8 G/DL (ref 4.1–5.1)
ALP SERPL-CCNC: 89 IU/L (ref 44–121)
ALT SERPL-CCNC: 27 IU/L (ref 0–44)
AST SERPL-CCNC: 24 IU/L (ref 0–40)
BASOPHILS # BLD AUTO: 0.1 X10E3/UL (ref 0–0.2)
BASOPHILS NFR BLD AUTO: 1 %
BILIRUB SERPL-MCNC: 0.5 MG/DL (ref 0–1.2)
BUN SERPL-MCNC: 9 MG/DL (ref 6–24)
BUN/CREAT SERPL: 7 (ref 9–20)
CALCIUM SERPL-MCNC: 10 MG/DL (ref 8.7–10.2)
CHLORIDE SERPL-SCNC: 100 MMOL/L (ref 96–106)
CHOLEST SERPL-MCNC: 189 MG/DL (ref 100–199)
CO2 SERPL-SCNC: 23 MMOL/L (ref 20–29)
CREAT SERPL-MCNC: 1.25 MG/DL (ref 0.76–1.27)
EGFRCR SERPLBLD CKD-EPI 2021: 71 ML/MIN/1.73
EOSINOPHIL # BLD AUTO: 0.2 X10E3/UL (ref 0–0.4)
EOSINOPHIL NFR BLD AUTO: 2 %
ERYTHROCYTE [DISTWIDTH] IN BLOOD BY AUTOMATED COUNT: 12.5 % (ref 11.6–15.4)
GLOBULIN SER CALC-MCNC: 2.5 G/DL (ref 1.5–4.5)
GLUCOSE SERPL-MCNC: 87 MG/DL (ref 70–99)
HCT VFR BLD AUTO: 45.2 % (ref 37.5–51)
HDLC SERPL-MCNC: 44 MG/DL
HGB BLD-MCNC: 15 G/DL (ref 13–17.7)
IMM GRANULOCYTES # BLD AUTO: 0 X10E3/UL (ref 0–0.1)
IMM GRANULOCYTES NFR BLD AUTO: 0 %
LDLC SERPL CALC-MCNC: 122 MG/DL (ref 0–99)
LYMPHOCYTES # BLD AUTO: 3.1 X10E3/UL (ref 0.7–3.1)
LYMPHOCYTES NFR BLD AUTO: 30 %
MCH RBC QN AUTO: 29.5 PG (ref 26.6–33)
MCHC RBC AUTO-ENTMCNC: 33.2 G/DL (ref 31.5–35.7)
MCV RBC AUTO: 89 FL (ref 79–97)
MONOCYTES # BLD AUTO: 0.8 X10E3/UL (ref 0.1–0.9)
MONOCYTES NFR BLD AUTO: 8 %
NEUTROPHILS # BLD AUTO: 6.1 X10E3/UL (ref 1.4–7)
NEUTROPHILS NFR BLD AUTO: 59 %
PLATELET # BLD AUTO: 253 X10E3/UL (ref 150–450)
POTASSIUM SERPL-SCNC: 4.4 MMOL/L (ref 3.5–5.2)
PROT SERPL-MCNC: 7.3 G/DL (ref 6–8.5)
RBC # BLD AUTO: 5.08 X10E6/UL (ref 4.14–5.8)
SODIUM SERPL-SCNC: 139 MMOL/L (ref 134–144)
TRIGL SERPL-MCNC: 129 MG/DL (ref 0–149)
VLDLC SERPL CALC-MCNC: 23 MG/DL (ref 5–40)
WBC # BLD AUTO: 10.3 X10E3/UL (ref 3.4–10.8)

## 2025-05-22 ENCOUNTER — OFFICE VISIT (OUTPATIENT)
Dept: CARDIOLOGY | Facility: CLINIC | Age: 45
End: 2025-05-22
Payer: COMMERCIAL

## 2025-05-22 VITALS
HEIGHT: 71 IN | BODY MASS INDEX: 26.36 KG/M2 | WEIGHT: 188.3 LBS | OXYGEN SATURATION: 98 % | HEART RATE: 69 BPM | DIASTOLIC BLOOD PRESSURE: 62 MMHG | SYSTOLIC BLOOD PRESSURE: 114 MMHG

## 2025-05-22 DIAGNOSIS — I45.6 WOLFF-PARKINSON-WHITE SYNDROME: Primary | ICD-10-CM

## 2025-05-22 DIAGNOSIS — E78.2 MIXED HYPERLIPIDEMIA: ICD-10-CM

## 2025-05-22 NOTE — PROGRESS NOTES
[facility]iology  Office visit  Rj Rincon  1980  [phone]  There is no work phone number on file.    VISIT DATE:  5/22/2025    PCP: Vel Ramirez DO  [address] [facility] [address]  [facility] [id number]    CC:  Chief Complaint   Patient presents with    Priscilla-Parkinson-White Syndrome    chest heaviness     PROBLEM LIST:  A. 5/9/2012 12 lead EKG revealing Priscilla Parkinson-White Pattern at 78 beats per minute  B. No previous cardiac evaluation   C. EPS 3/31/17 with mid-septal pathway antegrade block point 240 ms with no inducible arrhythmias, and sensitive to catheter pressure, no ablation attempted  D. Flecainide initiated 3/31/17    2018 coronary calcium score: 0    May 2024 CAC 0  ASSESSMENT:   Diagnosis Plan   1. Priscilla-Parkinson-White syndrome        2. Mixed hyperlipidemia              PLAN:  WPW:  Currently no symptoms concerning for recurrent tach arrhythmias.  Will arrange 2-week amatory ECG monitor if symptoms return.     Hyperlipidemia: Extensive family history of coronary disease.  Goal LDL less than 130, ideally less than 100.  Reassuring calcium score and LP(a).  Continue dietary lifestyle modification.    Subjective  Interval evaluation.  No exertional chest pain or dyspnea.  Preserved function capacity.  No sustained palpitations.      Initial evaluation: 42-year-old gentleman with a history of WPW presenting with left-sided chest discomfort.  Describes twinges of sharp shooting left-sided upper anterior chest wall pain lasting seconds duration.  No obvious triggers.  Denies tachypalpitations.  No presyncope or syncope.  Symptoms have now resolved.  Has a very physical job.  Chest wall is nontender to palpation.  Blood pressures running less than 130/80 mmHg.  Episodes of chest discomfort increased his anxiety.  He stopped taking his flecainide about 3 years ago.    PHYSICAL EXAMINATION:  Vitals:    05/22/25 0924   BP: 114/62   BP Location: Right arm   Patient  "Position: Sitting   Pulse: 69   SpO2: 98%   Weight: 85.4 kg (188 lb 4.8 oz)   Height: 180.3 cm (71\")       General Appearance:    Alert, cooperative, no distress, appears stated age   Head:    Normocephalic, without obvious abnormality, atraumatic   Eyes:    conjunctiva/corneas clear   Nose:   Nares normal, septum midline, mucosa normal, no drainage   Throat:   Lips, teeth and gums normal   Neck:   Supple, symmetrical, trachea midline, no carotid    bruit or JVD   Lungs:     Clear to auscultation bilaterally, respirations unlabored   Chest Wall:    No tenderness or deformity    Heart:    Regular rate and rhythm, S1 and S2 normal, no murmur, rub   or gallop, normal carotid impulse bilaterally without bruit.   Abdomen:     Soft, non-tender   Extremities:   Extremities normal, atraumatic, no cyanosis or edema   Pulses:   2+ and symmetric all extremities   Skin:   Skin color, texture, turgor normal, no rashes or lesions       Diagnostic Data:  Procedures  Lab Results   Component Value Date    CHLPL 189 05/09/2025    TRIG 129 05/09/2025    HDL 44 05/09/2025     Lab Results   Component Value Date    GLUCOSE 87 05/09/2025    BUN 9 05/09/2025    CREATININE 1.25 05/09/2025     05/09/2025    K 4.4 05/09/2025     05/09/2025    CO2 23 05/09/2025     No results found for: \"HGBA1C\"  Lab Results   Component Value Date    WBC 10.3 05/09/2025    HGB 15.0 05/09/2025    HCT 45.2 05/09/2025     05/09/2025       Allergies  No Known Allergies    Current Medications    Current Outpatient Medications:     Ascorbic Acid (ALYSSA-C PO), Take 1 tablet by mouth Daily. Emergency C pack daily, Disp: , Rfl:     fexofenadine (ALLEGRA) 180 MG tablet, Take 1 tablet by mouth Daily. Rotate with Zyrtec, Disp: , Rfl:     loratadine-pseudoephedrine (Claritin-D 12 Hour) 5-120 MG per 12 hr tablet, Take 1 tablet by mouth As Needed., Disp: , Rfl:     Misc Natural Products (FIBER 7 PO), Take 1 tablet by mouth Daily., Disp: , Rfl:     naproxen " sodium (ALEVE) 220 MG tablet, Take 2 tablets by mouth 2 (Two) Times a Day As Needed for Mild Pain., Disp: , Rfl:           ROS  ROS      SOCIAL HX  Social History     Socioeconomic History    Marital status:    Tobacco Use    Smoking status: Never     Passive exposure: Never    Smokeless tobacco: Never   Vaping Use    Vaping status: Never Used   Substance and Sexual Activity    Alcohol use: No    Drug use: No    Sexual activity: Yes     Partners: Female     Birth control/protection: Vasectomy       FAMILY HX  Family History   Problem Relation Age of Onset    Other Mother 69        CABG    Cancer Mother         Breast    Other Father         CABG in his 60's    Heart disease Father     No Known Problems Sister              Zurdo Allne III, MD, FACC

## (undated) DEVICE — FENESTRATED BIPOLAR FORCEPS: Brand: ENDOWRIST

## (undated) DEVICE — SEAL

## (undated) DEVICE — INSUFFLATION NEEDLE TO ESTABLISH PNEUMOPERITONEUM.: Brand: INSUFFLATION NEEDLE

## (undated) DEVICE — DRSNG SURESITE123 4X4.8IN

## (undated) DEVICE — MARKER,SKIN,WI/RULER AND LABELS: Brand: MEDLINE

## (undated) DEVICE — CANN NASL CO2 DIVIDED A/

## (undated) DEVICE — BLADELESS OBTURATOR: Brand: WECK VISTA

## (undated) DEVICE — ST INF PRI SMRTSTE 20DRP 2VLV 24ML 117

## (undated) DEVICE — RICH GENERAL LAPAROSCOPY: Brand: MEDLINE INDUSTRIES, INC.

## (undated) DEVICE — LEX ELECTRO PHYSIOLOGY: Brand: MEDLINE INDUSTRIES, INC.

## (undated) DEVICE — PATIENT RETURN ELECTRODE, SINGLE-USE, CONTACT QUALITY MONITORING, ADULT, WITH 9FT CORD, FOR PATIENTS WEIGING OVER 33LBS. (15KG): Brand: MEGADYNE

## (undated) DEVICE — INTRO SHEATH FASTCATH SRO .038IN 8.5F 63CM

## (undated) DEVICE — SUCTION IRRIGATOR: Brand: ENDOWRIST

## (undated) DEVICE — ST TBG PNEUMOCLEAR EVAC SMOKE HIFLO

## (undated) DEVICE — Device: Brand: MEDEX

## (undated) DEVICE — HDRST POSTN SLOTTED A/

## (undated) DEVICE — ST EXT IV SMARTSITE 2VLV SP M LL 5ML IV1

## (undated) DEVICE — SLV SCD CALF HEMOFORCE DVT THERP REPROC MD

## (undated) DEVICE — COLUMN DRAPE

## (undated) DEVICE — Device: Brand: REFERENCE PATCH CARTO 3

## (undated) DEVICE — SYR LUERLOK 30CC

## (undated) DEVICE — GOWN,PREVENTION PLUS,XL,ST,24/CS: Brand: MEDLINE

## (undated) DEVICE — Device: Brand: EZ STEER NAV

## (undated) DEVICE — Device: Brand: WEBSTER

## (undated) DEVICE — CLNSR INST PREKLENZ SOAK/SHLD 6ML MD

## (undated) DEVICE — ADULT, W/LG. BACK PAD, RADIOTRANSPARENT ELEMENT AND LEAD WIRE: Brand: DEFIBRILLATION ELECTRODES

## (undated) DEVICE — GLV SURG SENSICARE W/ALOE PF LF 8.5 STRL

## (undated) DEVICE — SYR SLPTP 30CC

## (undated) DEVICE — TIP-UP FENESTRATED GRASPER: Brand: ENDOWRIST

## (undated) DEVICE — SUT ETHIB EXCEL 2/0 V-7 30IN GRN X964H

## (undated) DEVICE — SET PRIMARY GRVTY 10DP MALE LL 104IN

## (undated) DEVICE — DECANTER: Brand: UNBRANDED

## (undated) DEVICE — SOL NACL 0.9PCT 1000ML

## (undated) DEVICE — ADHS LIQ MASTISOL 2/3ML

## (undated) DEVICE — TIP COVER ACCESSORY

## (undated) DEVICE — INTRO SHEATH ENGAGE W/50 GW .038 7F12

## (undated) DEVICE — CATH QUAD CRD 6F 5MM REPR

## (undated) DEVICE — ANTIBACTERIAL UNDYED BRAIDED (POLYGLACTIN 910), SYNTHETIC ABSORBABLE SUTURE: Brand: COATED VICRYL

## (undated) DEVICE — LIMB HOLDERS: Brand: DEROYAL

## (undated) DEVICE — 40583 XL ADVANCED TRENDELENBURG POSITIONING KIT: Brand: 40583 XL ADVANCED TRENDELENBURG POSITIONING KIT

## (undated) DEVICE — DECANT BG O JET

## (undated) DEVICE — VESSEL SEALER EXTEND: Brand: ENDOWRIST

## (undated) DEVICE — MEGA SUTURECUT ND: Brand: ENDOWRIST